# Patient Record
Sex: FEMALE | Race: OTHER | NOT HISPANIC OR LATINO
[De-identification: names, ages, dates, MRNs, and addresses within clinical notes are randomized per-mention and may not be internally consistent; named-entity substitution may affect disease eponyms.]

---

## 2020-01-17 ENCOUNTER — APPOINTMENT (OUTPATIENT)
Dept: ORTHOPEDIC SURGERY | Facility: CLINIC | Age: 55
End: 2020-01-17
Payer: COMMERCIAL

## 2020-01-17 VITALS — SYSTOLIC BLOOD PRESSURE: 123 MMHG | HEART RATE: 75 BPM | DIASTOLIC BLOOD PRESSURE: 82 MMHG | WEIGHT: 168 LBS

## 2020-01-17 DIAGNOSIS — M17.10 UNILATERAL PRIMARY OSTEOARTHRITIS, UNSPECIFIED KNEE: ICD-10-CM

## 2020-01-17 DIAGNOSIS — Z78.9 OTHER SPECIFIED HEALTH STATUS: ICD-10-CM

## 2020-01-17 DIAGNOSIS — Z86.79 PERSONAL HISTORY OF OTHER DISEASES OF THE CIRCULATORY SYSTEM: ICD-10-CM

## 2020-01-17 PROCEDURE — 99204 OFFICE O/P NEW MOD 45 MIN: CPT

## 2020-01-17 PROCEDURE — 73562 X-RAY EXAM OF KNEE 3: CPT | Mod: LT

## 2020-01-17 PROCEDURE — 72170 X-RAY EXAM OF PELVIS: CPT

## 2020-01-30 ENCOUNTER — FORM ENCOUNTER (OUTPATIENT)
Age: 55
End: 2020-01-30

## 2020-01-31 ENCOUNTER — OUTPATIENT (OUTPATIENT)
Dept: OUTPATIENT SERVICES | Facility: HOSPITAL | Age: 55
LOS: 1 days | End: 2020-01-31
Payer: COMMERCIAL

## 2020-01-31 ENCOUNTER — APPOINTMENT (OUTPATIENT)
Dept: MRI IMAGING | Facility: CLINIC | Age: 55
End: 2020-01-31
Payer: COMMERCIAL

## 2020-01-31 DIAGNOSIS — Z00.8 ENCOUNTER FOR OTHER GENERAL EXAMINATION: ICD-10-CM

## 2020-01-31 DIAGNOSIS — Z98.89 OTHER SPECIFIED POSTPROCEDURAL STATES: Chronic | ICD-10-CM

## 2020-01-31 PROCEDURE — 73721 MRI JNT OF LWR EXTRE W/O DYE: CPT

## 2020-01-31 PROCEDURE — 73721 MRI JNT OF LWR EXTRE W/O DYE: CPT | Mod: 26,LT

## 2020-02-12 PROBLEM — Z78.9 NO PERTINENT FAMILY HISTORY: Status: ACTIVE | Noted: 2020-02-12

## 2020-02-12 PROBLEM — M17.10 ARTHRITIS OF KNEE: Status: RESOLVED | Noted: 2020-02-12 | Resolved: 2020-02-12

## 2020-02-12 PROBLEM — Z86.79 HISTORY OF AORTIC VALVE STENOSIS: Status: RESOLVED | Noted: 2020-02-12 | Resolved: 2020-02-12

## 2020-02-12 RX ORDER — ATORVASTATIN CALCIUM 80 MG/1
TABLET, FILM COATED ORAL
Refills: 0 | Status: ACTIVE | COMMUNITY

## 2020-02-12 NOTE — CONSULT LETTER
[Consult Letter:] : I had the pleasure of evaluating your patient, [unfilled]. [Dear  ___] : Dear  [unfilled], [Please see my note below.] : Please see my note below. [Consult Closing:] : Thank you very much for allowing me to participate in the care of this patient.  If you have any questions, please do not hesitate to contact me. [Sincerely,] : Sincerely, [FreeTextEntry3] : Jarad Dominguez MD\par \par ______________________________________________\par [facility]: Hip/Knee Arthroplasty\par [address], [address], [place] [address]\par (t) [phone]\par (f) [phone]\par  [FreeTextEntry2] : RAZIA CONTRERAS\par

## 2020-02-12 NOTE — DISCUSSION/SUMMARY
[de-identified] : bilateral knee early DJD, left knee likely meniscus tear due to pain and mechanical symptoms. \par The natural history and treatment of degenerative arthritis was discussed with the patient at length today. The spectrum of treatment including nonoperative modalities to surgical intervention was elucidated. Noninvasive and nonoperative treatment modalities include weight reduction, activity modification with low impact exercise,  as needed use of acetaminophen or anti-inflammatory medications if tolerated, glucosamine/chondroitin supplements, and physical therapy. Further treatments can include corticosteroid injection and the use of viscosupplementation with hyaluronic acid injections. Definitive surgical treatment can certainly include total joint arthroplasty also. The risks and benefits of each treatment options was discussed and all questions were answered.\par The patient was informed of the findings and recommended conservative management in the form of a home exercise program, activity modifications, stationary bicycling, swimming and weight loss program. A trial of Glucosamine and Chondroiten Sulphate was recommended.\par I have recommended an MRI of the left knee to evaluate for medial meniscus tear. \par A prescription for a course of physical therapy was provided for bilateral knees\par A prescription for non-steroidal anti-inflammatory medication - diclofenac was provided and the risks, benefits and side effects were discussed.\par Concerning the bilateral LE edema with varicose veins, compression has been recommended, as well as elevation of the lower extremities. She  has been recommended for exercise, including foot pumps and walking. \par Follow up has been recommended following the MRI to review.\par \par \par \par

## 2020-02-12 NOTE — HISTORY OF PRESENT ILLNESS
[de-identified] : Ms. CISCO BARBER is a 55 year old female presents with bilateral knee pain, left more than right, beginning on 11/27/2019, now worsening. She notes the pain is sharp, localized to the medial aspect of the knee, but can be present generalized anterior and posterior knee as well. She notes the pain is intermittent, and present when walking, and with extension of the knee. She notes she feels that something inside her left knee has flipped, causing the knee to lock up with extension. She denies this feeling in the right knee. She admits to an aspiration of the left knee one month ago, with a steroid injection, with only mild relief for a short period. She admits to swelling of the knee. \par She denies any recent physical therapy. She notes she has relief with heat and rest. \par She presents for evaluation. \par She admits to a prior history of arthritis of the knees. \par She denies family history of any musculoskeletal conditions. \par She denies use of tobacco, alcohol and recreational drugs. She denies exercise. She lives with her family in a house.  [Worsening] : worsening [Intermit.] : ~He/She~ states the symptoms seem to be intermittent

## 2020-02-12 NOTE — PHYSICAL EXAM
[UE] : 5/5 motor strength in bilateral upper extremities [LE] : Sensory: Intact in bilateral lower extremities [Knee] : patellar 2+ and symmetric bilaterally [Ankle] : ankle 2+ and symmetric bilaterally [DP] : dorsalis pedis 2+ and symmetric bilaterally [PT] : posterior tibial 2+ and symmetric bilaterally [de-identified] : On general examination the patient is adequately groomed and nourished. The vital parameters are as recorded. \par There is no lymphedema, generalized swelling bilateral LE with varicose veins, no skin warmth/erythema/scars/swelling, no ulcers and no palpable lymph nodes or masses in both lower extremities. Bilateral pedal pulses are well palpable.\par Upper Extremity:\par Both right and left upper extremities are unremarkable in terms of skin rash, lesions, pigmentation, redness, tenderness, swelling, joint instability, abnormal deformity or crepitus. The overall range of motion, sensation, motor tone and strength testing are normal.\par \par Bilateral Knee Exam: \par The gait is bilateral stiff knee antalgic.\par Knee alignment:      slight valgus\par Both knees demonstrate no scars and the skin has no warmth, erythema, swelling or tenderness. \par Both knees have a range of motion of\par Extension:                    Right 0 degrees     Left 0 degrees\par Flexion:                                   Right 110 degrees      Left 100 degrees\par There is more pain with range of motion of the left knee than the right. \par There is right knee medial joint line tenderness, left knee medial joint line tenderness. There is left knee mild effusion. \par Fahad's test is positive and Chin test is positive on the left knee. Both are negative on the right. \par Lachman's test, Anterior/Posterior Drawer test and Pivot Shift Tests are negative. \par There is no mediolateral laxity and no anteroposterior instability. \par Patella compression test is positive and patellofemoral tracking is normal with no lateral subluxation, apprehension or instability. \par Right knee quadriceps and hamstrings power is 4+.\par Left knee quadriceps and hamstrings power is 4+.\par \par Hip Exam:\par The gait and station is normal\par The patient has equal leg lengths and no pelvic tilt. Hany/Adrienne test is 7 inches on the right and 7 inches on the left. Active SLR is 60 degrees on the right and 60 degrees on the left. Both hips demonstrate no scars and the skin has no signs of inflammation or tenderness. \par Both Hips have a normal range of motion of flexion to 100 degrees, abduction 40 degrees, adduction 20 degrees, external rotation 40 degrees, internal rotation 20 degrees with symmetrical motion in flexion and extension. There is no flexion contracture, deformity or instability. Labral impingement tests are negative.\par Both hips flexor, abductor and extensor power is normal.\par  [de-identified] : The following radiographs were ordered and read by me during this patients visit. I reviewed each radiograph in detail with the patient and discussed the findings as highlighted below. \par AP, lateral and skyline views of the bilateral knees confirm right knee mild to moderate DJD, with lateral joint line narrowing and osteophyte formation, left knee mild arthritis, with bone spurring lateral joint line. There is bilateral knee patellofemoral arthritis.  There is no sign of fracture bilateral knees. \par AP view of the pelvis are within normal limits\par

## 2020-02-14 ENCOUNTER — TRANSCRIPTION ENCOUNTER (OUTPATIENT)
Age: 55
End: 2020-02-14

## 2020-02-14 ENCOUNTER — INPATIENT (INPATIENT)
Facility: HOSPITAL | Age: 55
LOS: 1 days | Discharge: HOME | End: 2020-02-16
Attending: INTERNAL MEDICINE | Admitting: INTERNAL MEDICINE
Payer: COMMERCIAL

## 2020-02-14 VITALS
OXYGEN SATURATION: 97 % | WEIGHT: 160.06 LBS | HEIGHT: 62 IN | TEMPERATURE: 100 F | SYSTOLIC BLOOD PRESSURE: 139 MMHG | HEART RATE: 91 BPM | DIASTOLIC BLOOD PRESSURE: 79 MMHG | RESPIRATION RATE: 20 BRPM

## 2020-02-14 DIAGNOSIS — Z98.891 HISTORY OF UTERINE SCAR FROM PREVIOUS SURGERY: Chronic | ICD-10-CM

## 2020-02-14 DIAGNOSIS — Z98.89 OTHER SPECIFIED POSTPROCEDURAL STATES: Chronic | ICD-10-CM

## 2020-02-14 LAB
ALBUMIN SERPL ELPH-MCNC: 3.6 G/DL — SIGNIFICANT CHANGE UP (ref 3.3–5)
ALP SERPL-CCNC: 108 U/L — SIGNIFICANT CHANGE UP (ref 40–120)
ALT FLD-CCNC: 19 U/L — SIGNIFICANT CHANGE UP (ref 12–78)
AMYLASE P1 CFR SERPL: 48 U/L — SIGNIFICANT CHANGE UP (ref 25–115)
ANION GAP SERPL CALC-SCNC: 5 MMOL/L — SIGNIFICANT CHANGE UP (ref 5–17)
ANION GAP SERPL CALC-SCNC: 7 MMOL/L — SIGNIFICANT CHANGE UP (ref 5–17)
APPEARANCE UR: CLEAR — SIGNIFICANT CHANGE UP
AST SERPL-CCNC: 27 U/L — SIGNIFICANT CHANGE UP (ref 15–37)
BASOPHILS # BLD AUTO: 0.05 K/UL — SIGNIFICANT CHANGE UP (ref 0–0.2)
BASOPHILS NFR BLD AUTO: 0.3 % — SIGNIFICANT CHANGE UP (ref 0–2)
BILIRUB SERPL-MCNC: 0.8 MG/DL — SIGNIFICANT CHANGE UP (ref 0.2–1.2)
BILIRUB UR-MCNC: NEGATIVE — SIGNIFICANT CHANGE UP
BUN SERPL-MCNC: 10 MG/DL — SIGNIFICANT CHANGE UP (ref 7–23)
BUN SERPL-MCNC: 12 MG/DL — SIGNIFICANT CHANGE UP (ref 7–23)
CALCIUM SERPL-MCNC: 8.2 MG/DL — LOW (ref 8.5–10.1)
CALCIUM SERPL-MCNC: 9 MG/DL — SIGNIFICANT CHANGE UP (ref 8.5–10.1)
CHLORIDE SERPL-SCNC: 108 MMOL/L — SIGNIFICANT CHANGE UP (ref 96–108)
CHLORIDE SERPL-SCNC: 109 MMOL/L — HIGH (ref 96–108)
CO2 SERPL-SCNC: 26 MMOL/L — SIGNIFICANT CHANGE UP (ref 22–31)
CO2 SERPL-SCNC: 27 MMOL/L — SIGNIFICANT CHANGE UP (ref 22–31)
COLOR SPEC: YELLOW — SIGNIFICANT CHANGE UP
CREAT SERPL-MCNC: 0.72 MG/DL — SIGNIFICANT CHANGE UP (ref 0.5–1.3)
CREAT SERPL-MCNC: 0.76 MG/DL — SIGNIFICANT CHANGE UP (ref 0.5–1.3)
DIFF PNL FLD: ABNORMAL
EOSINOPHIL # BLD AUTO: 0.16 K/UL — SIGNIFICANT CHANGE UP (ref 0–0.5)
EOSINOPHIL NFR BLD AUTO: 1 % — SIGNIFICANT CHANGE UP (ref 0–6)
EPI CELLS # UR: SIGNIFICANT CHANGE UP
GLUCOSE SERPL-MCNC: 135 MG/DL — HIGH (ref 70–99)
GLUCOSE SERPL-MCNC: 167 MG/DL — HIGH (ref 70–99)
GLUCOSE UR QL: NEGATIVE MG/DL — SIGNIFICANT CHANGE UP
HCT VFR BLD CALC: 34 % — LOW (ref 34.5–45)
HCT VFR BLD CALC: 40.8 % — SIGNIFICANT CHANGE UP (ref 34.5–45)
HGB BLD-MCNC: 11.3 G/DL — LOW (ref 11.5–15.5)
HGB BLD-MCNC: 13.5 G/DL — SIGNIFICANT CHANGE UP (ref 11.5–15.5)
IMM GRANULOCYTES NFR BLD AUTO: 0.4 % — SIGNIFICANT CHANGE UP (ref 0–1.5)
KETONES UR-MCNC: ABNORMAL
LACTATE SERPL-SCNC: 1.7 MMOL/L — SIGNIFICANT CHANGE UP (ref 0.7–2)
LEUKOCYTE ESTERASE UR-ACNC: NEGATIVE — SIGNIFICANT CHANGE UP
LIDOCAIN IGE QN: 100 U/L — SIGNIFICANT CHANGE UP (ref 73–393)
LYMPHOCYTES # BLD AUTO: 1.26 K/UL — SIGNIFICANT CHANGE UP (ref 1–3.3)
LYMPHOCYTES # BLD AUTO: 7.7 % — LOW (ref 13–44)
MCHC RBC-ENTMCNC: 27.7 PG — SIGNIFICANT CHANGE UP (ref 27–34)
MCHC RBC-ENTMCNC: 27.9 PG — SIGNIFICANT CHANGE UP (ref 27–34)
MCHC RBC-ENTMCNC: 33.1 GM/DL — SIGNIFICANT CHANGE UP (ref 32–36)
MCHC RBC-ENTMCNC: 33.2 GM/DL — SIGNIFICANT CHANGE UP (ref 32–36)
MCV RBC AUTO: 83.8 FL — SIGNIFICANT CHANGE UP (ref 80–100)
MCV RBC AUTO: 84 FL — SIGNIFICANT CHANGE UP (ref 80–100)
MONOCYTES # BLD AUTO: 0.86 K/UL — SIGNIFICANT CHANGE UP (ref 0–0.9)
MONOCYTES NFR BLD AUTO: 5.3 % — SIGNIFICANT CHANGE UP (ref 2–14)
NEUTROPHILS # BLD AUTO: 13.93 K/UL — HIGH (ref 1.8–7.4)
NEUTROPHILS NFR BLD AUTO: 85.3 % — HIGH (ref 43–77)
NITRITE UR-MCNC: NEGATIVE — SIGNIFICANT CHANGE UP
NRBC # BLD: 0 /100 WBCS — SIGNIFICANT CHANGE UP (ref 0–0)
NRBC # BLD: 0 /100 WBCS — SIGNIFICANT CHANGE UP (ref 0–0)
PH UR: 8 — SIGNIFICANT CHANGE UP (ref 5–8)
PLATELET # BLD AUTO: 220 K/UL — SIGNIFICANT CHANGE UP (ref 150–400)
PLATELET # BLD AUTO: 247 K/UL — SIGNIFICANT CHANGE UP (ref 150–400)
POTASSIUM SERPL-MCNC: 3.4 MMOL/L — LOW (ref 3.5–5.3)
POTASSIUM SERPL-MCNC: 4.9 MMOL/L — SIGNIFICANT CHANGE UP (ref 3.5–5.3)
POTASSIUM SERPL-SCNC: 3.4 MMOL/L — LOW (ref 3.5–5.3)
POTASSIUM SERPL-SCNC: 4.9 MMOL/L — SIGNIFICANT CHANGE UP (ref 3.5–5.3)
PROT SERPL-MCNC: 7.7 GM/DL — SIGNIFICANT CHANGE UP (ref 6–8.3)
PROT UR-MCNC: NEGATIVE MG/DL — SIGNIFICANT CHANGE UP
RBC # BLD: 4.05 M/UL — SIGNIFICANT CHANGE UP (ref 3.8–5.2)
RBC # BLD: 4.87 M/UL — SIGNIFICANT CHANGE UP (ref 3.8–5.2)
RBC # FLD: 12.9 % — SIGNIFICANT CHANGE UP (ref 10.3–14.5)
RBC # FLD: 13 % — SIGNIFICANT CHANGE UP (ref 10.3–14.5)
RBC CASTS # UR COMP ASSIST: ABNORMAL /HPF (ref 0–4)
SODIUM SERPL-SCNC: 141 MMOL/L — SIGNIFICANT CHANGE UP (ref 135–145)
SODIUM SERPL-SCNC: 141 MMOL/L — SIGNIFICANT CHANGE UP (ref 135–145)
SP GR SPEC: 1.01 — SIGNIFICANT CHANGE UP (ref 1.01–1.02)
TROPONIN I SERPL-MCNC: <.015 NG/ML — SIGNIFICANT CHANGE UP (ref 0.01–0.04)
UROBILINOGEN FLD QL: NEGATIVE MG/DL — SIGNIFICANT CHANGE UP
WBC # BLD: 10.81 K/UL — HIGH (ref 3.8–10.5)
WBC # BLD: 16.33 K/UL — HIGH (ref 3.8–10.5)
WBC # FLD AUTO: 10.81 K/UL — HIGH (ref 3.8–10.5)
WBC # FLD AUTO: 16.33 K/UL — HIGH (ref 3.8–10.5)

## 2020-02-14 PROCEDURE — 99221 1ST HOSP IP/OBS SF/LOW 40: CPT | Mod: AI

## 2020-02-14 PROCEDURE — 74019 RADEX ABDOMEN 2 VIEWS: CPT | Mod: 26

## 2020-02-14 PROCEDURE — 99253 IP/OBS CNSLTJ NEW/EST LOW 45: CPT

## 2020-02-14 PROCEDURE — 99232 SBSQ HOSP IP/OBS MODERATE 35: CPT

## 2020-02-14 PROCEDURE — 93010 ELECTROCARDIOGRAM REPORT: CPT

## 2020-02-14 PROCEDURE — 76700 US EXAM ABDOM COMPLETE: CPT | Mod: 26

## 2020-02-14 PROCEDURE — 74177 CT ABD & PELVIS W/CONTRAST: CPT | Mod: 26

## 2020-02-14 PROCEDURE — 71045 X-RAY EXAM CHEST 1 VIEW: CPT | Mod: 26

## 2020-02-14 PROCEDURE — 99285 EMERGENCY DEPT VISIT HI MDM: CPT

## 2020-02-14 RX ORDER — MORPHINE SULFATE 50 MG/1
4 CAPSULE, EXTENDED RELEASE ORAL ONCE
Refills: 0 | Status: DISCONTINUED | OUTPATIENT
Start: 2020-02-14 | End: 2020-02-14

## 2020-02-14 RX ORDER — SODIUM CHLORIDE 9 MG/ML
1000 INJECTION, SOLUTION INTRAVENOUS
Refills: 0 | Status: DISCONTINUED | OUTPATIENT
Start: 2020-02-14 | End: 2020-02-14

## 2020-02-14 RX ORDER — IOHEXOL 300 MG/ML
60 INJECTION, SOLUTION INTRAVENOUS ONCE
Refills: 0 | Status: COMPLETED | OUTPATIENT
Start: 2020-02-14 | End: 2020-02-14

## 2020-02-14 RX ORDER — SODIUM CHLORIDE 9 MG/ML
1000 INJECTION INTRAMUSCULAR; INTRAVENOUS; SUBCUTANEOUS ONCE
Refills: 0 | Status: DISCONTINUED | OUTPATIENT
Start: 2020-02-14 | End: 2020-02-14

## 2020-02-14 RX ORDER — HEPARIN SODIUM 5000 [USP'U]/ML
5000 INJECTION INTRAVENOUS; SUBCUTANEOUS EVERY 12 HOURS
Refills: 0 | Status: DISCONTINUED | OUTPATIENT
Start: 2020-02-14 | End: 2020-02-16

## 2020-02-14 RX ORDER — ONDANSETRON 8 MG/1
4 TABLET, FILM COATED ORAL ONCE
Refills: 0 | Status: COMPLETED | OUTPATIENT
Start: 2020-02-14 | End: 2020-02-14

## 2020-02-14 RX ORDER — METOCLOPRAMIDE HCL 10 MG
10 TABLET ORAL ONCE
Refills: 0 | Status: COMPLETED | OUTPATIENT
Start: 2020-02-14 | End: 2020-02-14

## 2020-02-14 RX ORDER — PANTOPRAZOLE SODIUM 20 MG/1
40 TABLET, DELAYED RELEASE ORAL DAILY
Refills: 0 | Status: DISCONTINUED | OUTPATIENT
Start: 2020-02-14 | End: 2020-02-16

## 2020-02-14 RX ORDER — SODIUM CHLORIDE 9 MG/ML
1000 INJECTION INTRAMUSCULAR; INTRAVENOUS; SUBCUTANEOUS ONCE
Refills: 0 | Status: COMPLETED | OUTPATIENT
Start: 2020-02-14 | End: 2020-02-14

## 2020-02-14 RX ORDER — ONDANSETRON 8 MG/1
4 TABLET, FILM COATED ORAL EVERY 6 HOURS
Refills: 0 | Status: DISCONTINUED | OUTPATIENT
Start: 2020-02-14 | End: 2020-02-16

## 2020-02-14 RX ORDER — PANTOPRAZOLE SODIUM 20 MG/1
40 TABLET, DELAYED RELEASE ORAL ONCE
Refills: 0 | Status: COMPLETED | OUTPATIENT
Start: 2020-02-14 | End: 2020-02-14

## 2020-02-14 RX ORDER — ACETAMINOPHEN 500 MG
1000 TABLET ORAL ONCE
Refills: 0 | Status: COMPLETED | OUTPATIENT
Start: 2020-02-14 | End: 2020-02-14

## 2020-02-14 RX ORDER — PIPERACILLIN AND TAZOBACTAM 4; .5 G/20ML; G/20ML
3.38 INJECTION, POWDER, LYOPHILIZED, FOR SOLUTION INTRAVENOUS EVERY 8 HOURS
Refills: 0 | Status: DISCONTINUED | OUTPATIENT
Start: 2020-02-14 | End: 2020-02-14

## 2020-02-14 RX ORDER — SODIUM CHLORIDE 9 MG/ML
1000 INJECTION, SOLUTION INTRAVENOUS
Refills: 0 | Status: DISCONTINUED | OUTPATIENT
Start: 2020-02-14 | End: 2020-02-16

## 2020-02-14 RX ORDER — POTASSIUM CHLORIDE 20 MEQ
10 PACKET (EA) ORAL
Refills: 0 | Status: COMPLETED | OUTPATIENT
Start: 2020-02-14 | End: 2020-02-14

## 2020-02-14 RX ORDER — DEXTROSE MONOHYDRATE, SODIUM CHLORIDE, AND POTASSIUM CHLORIDE 50; .745; 4.5 G/1000ML; G/1000ML; G/1000ML
1000 INJECTION, SOLUTION INTRAVENOUS
Refills: 0 | Status: DISCONTINUED | OUTPATIENT
Start: 2020-02-14 | End: 2020-02-14

## 2020-02-14 RX ADMIN — IOHEXOL 30 MILLILITER(S): 300 INJECTION, SOLUTION INTRAVENOUS at 11:57

## 2020-02-14 RX ADMIN — Medication 1000 MILLIGRAM(S): at 15:00

## 2020-02-14 RX ADMIN — SODIUM CHLORIDE 1000 MILLILITER(S): 9 INJECTION INTRAMUSCULAR; INTRAVENOUS; SUBCUTANEOUS at 03:55

## 2020-02-14 RX ADMIN — HEPARIN SODIUM 5000 UNIT(S): 5000 INJECTION INTRAVENOUS; SUBCUTANEOUS at 17:56

## 2020-02-14 RX ADMIN — PIPERACILLIN AND TAZOBACTAM 25 GRAM(S): 4; .5 INJECTION, POWDER, LYOPHILIZED, FOR SOLUTION INTRAVENOUS at 05:47

## 2020-02-14 RX ADMIN — PANTOPRAZOLE SODIUM 40 MILLIGRAM(S): 20 TABLET, DELAYED RELEASE ORAL at 12:57

## 2020-02-14 RX ADMIN — ONDANSETRON 4 MILLIGRAM(S): 8 TABLET, FILM COATED ORAL at 02:01

## 2020-02-14 RX ADMIN — Medication 400 MILLIGRAM(S): at 13:16

## 2020-02-14 RX ADMIN — SODIUM CHLORIDE 110 MILLILITER(S): 9 INJECTION, SOLUTION INTRAVENOUS at 11:55

## 2020-02-14 RX ADMIN — MORPHINE SULFATE 4 MILLIGRAM(S): 50 CAPSULE, EXTENDED RELEASE ORAL at 02:01

## 2020-02-14 RX ADMIN — Medication 30 MILLILITER(S): at 03:54

## 2020-02-14 RX ADMIN — HEPARIN SODIUM 5000 UNIT(S): 5000 INJECTION INTRAVENOUS; SUBCUTANEOUS at 05:47

## 2020-02-14 RX ADMIN — MORPHINE SULFATE 4 MILLIGRAM(S): 50 CAPSULE, EXTENDED RELEASE ORAL at 02:31

## 2020-02-14 RX ADMIN — ONDANSETRON 4 MILLIGRAM(S): 8 TABLET, FILM COATED ORAL at 04:11

## 2020-02-14 RX ADMIN — SODIUM CHLORIDE 100 MILLILITER(S): 9 INJECTION, SOLUTION INTRAVENOUS at 07:47

## 2020-02-14 RX ADMIN — Medication 100 MILLIEQUIVALENT(S): at 14:59

## 2020-02-14 RX ADMIN — Medication 100 MILLIEQUIVALENT(S): at 17:56

## 2020-02-14 RX ADMIN — Medication 10 MILLIGRAM(S): at 12:57

## 2020-02-14 RX ADMIN — PANTOPRAZOLE SODIUM 40 MILLIGRAM(S): 20 TABLET, DELAYED RELEASE ORAL at 03:55

## 2020-02-14 RX ADMIN — Medication 100 MILLIEQUIVALENT(S): at 12:56

## 2020-02-14 RX ADMIN — Medication 10 MILLIGRAM(S): at 05:32

## 2020-02-14 NOTE — H&P ADULT - ASSESSMENT
56y/o female with High grade SBO  - as discussed with Dr. Lyons  - npo/ngt/ivf  - pain meds  - dvt/gi ppx  - medical/cardio consult  - repeat AXR  - f/u UA/urine culture

## 2020-02-14 NOTE — H&P ADULT - HISTORY OF PRESENT ILLNESS
56y/o with H/O Aortic Stenosis, HLD, GERD, hiatal hernia, PSH of c-sections presents to ER with c/o epigastric pain x3days. Pt states she first had nausea then abdominal pain that has gotten worse. Pain is now 6/10, nonradiating had one episode of nonbloody emesis in ER.  Denies diarrhea, dyspnea, chest pain.  Patient has been taking naproxen for arthritis, took omeprazole at home without relief. Pt is followed by GI (Dr. Bledsoe), last colonoscopy and EGD within the last yr. Pt seen by Dr. Almonte (cardiology). Last BM and flatus was last night, also last meal. Pt recently treated for UTI with 5days of cipro.

## 2020-02-14 NOTE — ED ADULT TRIAGE NOTE - CHIEF COMPLAINT QUOTE
Patient reports through  : epigastric pain, nausea , vomiting diarrhea. Pain started this morning, progressively worsening.

## 2020-02-14 NOTE — CHART NOTE - NSCHARTNOTEFT_GEN_A_CORE
Pt discussed with Dr Lyons, labs/imaging reviewed.  Pt with +flatus and BM.               11.3   10.81 )-----------( 220      ( 14 Feb 2020 10:53 )             34.0   02-14    141  |  109<H>  |  10  ----------------------------<  167<H>  3.4<L>   |  27  |  0.72    Ca    8.2<L>      14 Feb 2020 10:53    TPro  7.7  /  Alb  3.6  /  TBili  0.8  /  DBili  x   /  AST  27  /  ALT  19  /  AlkPhos  108  02-14    *Hypokalemia was repleted IV earlier today.  *XR reviewed. Contrast in colon  *Per Dr Lyons, diet advanced to full liquids.  *Continue to monitor.

## 2020-02-14 NOTE — H&P ADULT - NSHPPHYSICALEXAM_GEN_ALL_CORE
Gen: Alert, distressed  Head: NC, AT, normal lids/conjunctiva  ENT: normal hearing, patent oropharynx without erythema/exudate, uvula midline  Neck: +supple, no JVD, +Trachea midline  Pulm: Bilateral BS, normal resp effort, no wheeze/stridor/retractions  CV: RRR, no M/R/G, +dist pulses	  Abd: obese, soft, csection scar healed with huge scar,  ND, +epigastric pain,+BS, umbilical hernia reduced,  Mskel: no edema/erythema/cyanosis  Skin: no rash, warm/dry  Neuro: AAOx3, no apparent sensory/motor deficits, coordination intact

## 2020-02-14 NOTE — ED ADULT NURSE NOTE - NSIMPLEMENTINTERV_GEN_ALL_ED
Implemented All Universal Safety Interventions:  [place] to call system. Call bell, personal items and telephone within reach. Instruct patient to call for assistance. Room bathroom lighting operational. Non-slip footwear when patient is off stretcher. Physically safe environment: no spills, clutter or unnecessary equipment. Stretcher in lowest position, wheels locked, appropriate side rails in place.

## 2020-02-14 NOTE — PROGRESS NOTE ADULT - SUBJECTIVE AND OBJECTIVE BOX
Surgery NP note  Patient seen and examined with Dr. Lyons with daughter at bedside resting comfortably.  Patient states she is doing better, pain has improved from last night.   No complaints offered. Abdominal pain is well controlled.  Denies nausea and vomiting. NPO  No flatus/BM reported.     T(F): 97.6 (02-14-20 @ 05:10), Max: 100 (02-14-20 @ 00:31)  HR: 74 (02-14-20 @ 07:43) (74 - 91)  BP: 125/89 (02-14-20 @ 07:43) (125/89 - 141/81)  RR: 19 (02-14-20 @ 07:43) (14 - 20)  SpO2: 96% (02-14-20 @ 07:43) (96% - 97%)  Wt(kg): --  CAPILLARY BLOOD GLUCOSE          PHYSICAL EXAM:  General: NAD, WDWN.   Neuro:  Alert & responsive  HEENT: NCAT, EOMI, conjunctiva clear  CV: +S1+S2 regular rate and rhythm  Lung: clear to ausculation bilaterally, respirations nonlabored, good inspiratory effort  Abdomen: soft, Non tender, No Distension. Lower abdominal Midline scar present. Normal active BS. Small reducible umbilical hernia present.   Extremities: no pedal edema or calf tenderness noted     LABS:                        13.5   16.33 )-----------( 247      ( 14 Feb 2020 02:06 )             40.8     02-14    141  |  108  |  12  ----------------------------<  135<H>  4.9   |  26  |  0.76    Ca    9.0      14 Feb 2020 02:06    TPro  7.7  /  Alb  3.6  /  TBili  0.8  /  DBili  x   /  AST  27  /  ALT  19  /  AlkPhos  108  02-14    LIVER FUNCTIONS - ( 14 Feb 2020 02:06 )  Alb: 3.6 g/dL / Pro: 7.7 gm/dL / ALK PHOS: 108 U/L / ALT: 19 U/L / AST: 27 U/L / GGT: x             I&O's Detail        Impression: 55y Female admitted with high grade SBO and umbilical hernia with interval reduction in ER by surgical team. Clinically improving, leukocytosis.   PMH Hiatal hernia with GERD  High cholesterol  History of aortic stenosis  Acid reflux      Plan:  -Dulcolax suppository x1, Gastrograffin challenge today. Follow up with X-ray   - Follow up urine culture  - Continue NPO with IV hydration.   - Serial abdominal exams.   -continue VTE prophylaxis with SQ heparin and SCDs  -Increase activity with PT, OOB, Ambulate  -Patient instructed on and encouraged incentive spirometry use  -f/u AM labs  -will discuss with surgical attending for Recommendations Surgery NP note  Patient seen and examined with Dr. Lyons with daughter at bedside resting comfortably.  Patient states she is doing better, pain has improved from last night.   No complaints offered. Abdominal pain is well controlled.  Denies nausea and vomiting. NPO  No flatus/BM reported.     T(F): 97.6 (02-14-20 @ 05:10), Max: 100 (02-14-20 @ 00:31)  HR: 74 (02-14-20 @ 07:43) (74 - 91)  BP: 125/89 (02-14-20 @ 07:43) (125/89 - 141/81)  RR: 19 (02-14-20 @ 07:43) (14 - 20)  SpO2: 96% (02-14-20 @ 07:43) (96% - 97%)  Wt(kg): --  CAPILLARY BLOOD GLUCOSE          PHYSICAL EXAM:  General: NAD, WDWN.   Neuro:  Alert & responsive  HEENT: NCAT, EOMI, conjunctiva clear  CV: +S1+S2 regular rate and rhythm  Lung: clear to ausculation bilaterally, respirations nonlabored, good inspiratory effort  Abdomen: soft, Non tender, No Distension. Lower abdominal Midline scar present. Normal active BS. Small reducible umbilical hernia present.   Extremities: no pedal edema or calf tenderness noted     LABS:                        13.5   16.33 )-----------( 247      ( 14 Feb 2020 02:06 )             40.8     02-14 02-14    141  |  109<H>  |  10  ----------------------------<  167<H>  3.4<L>   |  27  |  0.72    Ca    8.2<L>      14 Feb 2020 10:53    TPro  7.7  /  Alb  3.6  /  TBili  0.8  /  DBili  x   /  AST  27  /  ALT  19  /  AlkPhos  108  02-14      Ca    9.0      14 Feb 2020 02:06    TPro  7.7  /  Alb  3.6  /  TBili  0.8  /  DBili  x   /  AST  27  /  ALT  19  /  AlkPhos  108  02-14    LIVER FUNCTIONS - ( 14 Feb 2020 02:06 )  Alb: 3.6 g/dL / Pro: 7.7 gm/dL / ALK PHOS: 108 U/L / ALT: 19 U/L / AST: 27 U/L / GGT: x             I&O's Detail        Impression: 55y Female admitted with high grade SBO and umbilical hernia with interval reduction in ER by surgical team. Clinically improving, leukocytosis, Hypokalemia    PMH Hiatal hernia with GERD  High cholesterol  History of aortic stenosis  Acid reflux      Plan:  -Dulcolax suppository x1, Gastrograffin challenge today. Follow up with X-ray   - Follow up urine culture  - Continue NPO with IV hydration. Hypokalemia repleted  - Serial abdominal exams.   -continue VTE prophylaxis with SQ heparin and SCDs  -Increase activity with PT, OOB, Ambulate  -Patient instructed on and encouraged incentive spirometry use  -f/u AM labs  -will discuss with surgical attending for Recommendations

## 2020-02-14 NOTE — H&P ADULT - NSICDXPASTMEDICALHX_GEN_ALL_CORE_FT
PAST MEDICAL HISTORY:  Acid reflux     Hiatal hernia with GERD     High cholesterol     History of aortic stenosis

## 2020-02-14 NOTE — H&P ADULT - NSHPREVIEWOFSYSTEMS_GEN_ALL_CORE
ROS: No fever/chills, No headache/photophobia/eye pain/changes in vision, No ear pain/sore throat/dysphagia, No chest pain/palpitations, no SOB/cough/wheeze/stridor, No D/melena, no dysuria/frequency/discharge, No neck/back pain, no rash, no changes in neurological

## 2020-02-14 NOTE — H&P ADULT - NSHPLABSRESULTS_GEN_ALL_CORE
FINDINGS:    LOWER CHEST: Lingular linear atelectasis. Aortic valve annulus calcifications.    LIVER: Subcentimeter hypodense lesion which is too small for accurate characterization.  BILE DUCTS: Normal caliber.  GALLBLADDER: Within normal limits.  SPLEEN: Within normal limits.  PANCREAS: Within normal limits.  ADRENALS: Within normal limits.  KIDNEYS/URETERS: Within normal limits.    BLADDER: Within normal limits.  REPRODUCTIVE ORGANS: Uterus and ovaries within normal limits.    BOWEL: Dilated loops of small bowel measuring up to 3.0 cm to a transition point at the level of the umbilicus distal to fecalized loops (series 2, images 71-80) compatible with a high-grade small bowel obstruction. Appendix within normal limits.  PERITONEUM: Small amount of abdominal and pelvic free fluid.  VESSELS: Within normal limits.  RETROPERITONEUM/LYMPH NODES: No lymphadenopathy.    ABDOMINAL WALL: Small fat-containing umbilical hernia.  BONES: Degenerative changes of the spine.    IMPRESSION:     High-grade small bowel obstruction with transition point at the level of the umbilicus.

## 2020-02-14 NOTE — PATIENT PROFILE ADULT - NSTOBACCONEVERSMOKERY/N_GEN_A
<<-----Click on this checkbox to enter Procedure Total thyroidectomy  02/05/2018    Active  [id number] No

## 2020-02-14 NOTE — CONSULT NOTE ADULT - SUBJECTIVE AND OBJECTIVE BOX
54y/o female with history Aortic Stenosis, HLD, GERD, hiatal hernia, PSH of c-sections presents to ER with c/o epigastric pain x 3 days.  Pt states she first had nausea then abdominal pain that has gotten worse. Pain is now 6/10, nonradiating had one episode of nonbloody emesis in ER.  Denies diarrhea, dyspnea, chest pain.  Patient has been taking naproxen for arthritis, took omeprazole at home without relief. Pt is followed by GI (Dr. Bledsoe), last colonoscopy and EGD within the last yr. Pt seen by Dr. Almonte (cardiology). Last BM and flatus was last night, also last meal. Pt recently treated for UTI with 5days of cipro.   She is admitted to the surgical service for high grade SBO, a medical consult was requested for management of co-morbidities while in the hospital.    PAST MEDICAL HISTORY:  Acid reflux   Hiatal hernia with GERD   High cholesterol   History of aortic stenosis.     PAST SURGICAL HISTORY:  S/P  section x 2.     FAMILY HISTORY:  FH: CVA (cerebrovascular accident).    Social History:  Social History (marital status, living situation, occupation, tobacco use, alcohol and drug use, and sexual history): Housewife.  Patient denies EtOH/tobacco/illicit substance use.    Allergies:  NKDA.   Allergic to avocado:  Angioedema      MEDICATIONS  (STANDING):  dextrose 5% + sodium chloride 0.45%. 1000 milliLiter(s) (100 mL/Hr) IV Continuous <Continuous>  heparin  Injectable 5000 Unit(s) SubCutaneous every 12 hours  pantoprazole  Injectable 40 milliGRAM(s) IV Push daily  piperacillin/tazobactam IVPB.. 3.375 Gram(s) IV Intermittent every 8 hours    MEDICATIONS  (PRN):  ondansetron Injectable 4 milliGRAM(s) IV Push every 6 hours PRN Nausea      REVIEW OF SYSTEMS:  CONSTITUTIONAL: No fever,    EYES: No eye pain, visual disturbances, or discharge  ENMT:   No sinus or throat pain  NECK: No pain or stiffness  RESPIRATORY: No cough, wheezing, chills or hemoptysis; No shortness of breath  CARDIOVASCULAR: No chest pain, palpitations, dizziness, or leg swelling  GASTROINTESTINAL:   abdominal  pain., (+) nausea, vomiting,   GENITOURINARY: No dysuria, frequency, hematuria, or incontinence  NEUROLOGICAL: No headaches, memory loss, loss of strength, numbness, or tremors  SKIN: No itching, burning, rashes, or lesions      Vital Signs Last 24 Hrs  T(C): 36.4 (2020 05:10), Max: 37.8 (2020 00:31)  T(F): 97.6 (2020 05:10), Max: 100 (2020 00:31)  HR: 74 (2020 07:43) (74 - 91)  BP: 125/89 (2020 07:43) (125/89 - 141/81)  BP(mean): --  RR: 19 (2020 07:43) (14 - 20)  SpO2: 96% (2020 07:43) (96% - 97%)    PHYSICAL EXAM:  GENERAL: NAD, well-groomed, well-developed  HEAD:  Atraumatic, Normocephalic  EYES: EOMI, PERRLA, conjunctiva and sclera clear  ENMT:  Moist mucous membranes, NGT in place.  NECK: Supple, No JVD   NERVOUS SYSTEM:  Alert & Oriented X 3, Good concentration; Motor Strength 5/5 B/L upper and lower extremities; DTRs 2+ intact and symmetric  CHEST/LUNG: Clear to auscultation  bilaterally; No rales, rhonchi, wheezing, or rubs  HEART: Regular rate and rhythm; No murmurs, rubs, or gallops  ABDOMEN: Soft, Nontender, hypoactive bowel sounds, umbilical hernia noted.  EXTREMITIES:  2+ Peripheral Pulses, No clubbing, cyanosis, or edema  LYMPH: No lymphadenopathy noted  SKIN: No rashes or lesions    LABS:                        13.5   16.33 )-----------( 247      ( 2020 02:06 )             40.8     02-14    141  |  108  |  12  ----------------------------<  135<H>  4.9   |  26  |  0.76    Ca    9.0      2020 02:06    TPro  7.7  /  Alb  3.6  /  TBili  0.8  /  DBili  x   /  AST  27  /  ALT  19  /  AlkPhos  108  02-14       cardiac markers Troponin <.015  CK --    Urinalysis Basic - ( 2020 08:50 )    Color: Yellow / Appearance: Clear / S.010 / pH: x  Gluc: x / Ketone: Small  / Bili: Negative / Urobili: Negative mg/dL   Blood: x / Protein: Negative mg/dL / Nitrite: Negative   Leuk Esterase: Negative / RBC: 3-5 /HPF / WBC x   Sq Epi: x / Non Sq Epi: Occasional / Bacteria: x      CAPILLARY BLOOD GLUCOSE        Cultures    RADIOLOGY & ADDITIONAL TESTS:    Imaging Personally Reviewed:  [ ] YES  [ ] NO    Consultant(s) Notes Reviewed:  [ ] YES  [ ] NO    Care Discussed with Consultants/Other Providers [ ] YES  [ ] NO

## 2020-02-14 NOTE — CONSULT NOTE ADULT - ASSESSMENT
54y/o female with history Aortic Stenosis, HLD, GERD, hiatal hernia, PSH of c-sections presents to ER with c/o epigastric pain x 3 days.  Pt states she first had nausea then abdominal pain that has gotten worse. Pain is now 6/10, nonradiating had one episode of nonbloody emesis in ER.   Patient has been taking naproxen for arthritis, took omeprazole at home without relief. Pt is followed by GI (Dr. Bledsoe), last colonoscopy and EGD within the last yr. Pt seen by Dr. Almonte (cardiology). Last BM and flatus was last night, also last meal. Pt recently treated for UTI with 5days of cipro.   She is admitted to the surgical service for high grade SBO, a medical consult was requested for management of co-morbidities while in the hospital.   She denies diarrhea, dyspnea, chest pain.    A/p:  High grade SBO.  - NPO.  - NGT  - IVF hydration.  - pain meds as needed  - management as per surgery.    Leukocytosis:   - likely secondary to acute illness, UA is negative.  - will monitor.    HLD  - check lipid profile in AM.  - resume anti-lipid once she is on oral diet.    H/o AS.  - ECHO  - cardiology eval.  - will review all meds.    Thank you for the medical consult.

## 2020-02-14 NOTE — CHART NOTE - NSCHARTNOTEFT_GEN_A_CORE
Notified by RN, patient had large bowel movement after xray. Xray reviewed - Contrast seen throughout Colon. NGT D/C .   Will start full liquids and advance if tolerated in am. Possible d/c home in am

## 2020-02-14 NOTE — ED PROVIDER NOTE - PHYSICAL EXAMINATION
Gen: Alert, distressed  Head: NC, AT, normal lids/conjunctiva  ENT: normal hearing, patent oropharynx without erythema/exudate, uvula midline  Neck: +supple, no JVD, +Trachea midline  Pulm: Bilateral BS, normal resp effort, no wheeze/stridor/retractions  CV: RRR, no M/R/G, +dist pulses  Abd: soft, +epigastric pain, Positive [place] signs, +BS, no palpable masses  Mskel: no edema/erythema/cyanosis  Skin: no rash, warm/dry  Neuro: AAOx3, no apparent sensory/motor deficits, coordination intact Gen: Alert, distressed  Head: NC, AT, normal lids/conjunctiva  ENT: normal hearing, patent oropharynx without erythema/exudate, uvula midline  Neck: +supple, no JVD, +Trachea midline  Pulm: Bilateral BS, normal resp effort, no wheeze/stridor/retractions  CV: RRR, systolic murmur, +dist pulses  Abd: soft, +epigastric pain, Positive [place] signs, +BS, no palpable masses  Mskel: no edema/erythema/cyanosis  Skin: no rash, warm/dry  Neuro: AAOx3, no apparent sensory/motor deficits, coordination intact

## 2020-02-14 NOTE — ED PROVIDER NOTE - OBJECTIVE STATEMENT
Pertinent PMH/PSH/FHx/SHx and Review of Systems contained within:  Patient presents to the ED for abdominal pain.  Patient p/w epigastric pain radiating to RUQ and had one episode of nonbloody emesis in ER.  Denies diarrhea, dyspnea, chest pain.  Patient has been taking naproxen for arthritis, took omeprazole at home without relief.     Relevant PMHx/SHx/SOCHx/FAMH:  GERD, C section, arthritis  Patient denies EtOH/tobacco/illicit substance use.    ROS: No fever/chills, No headache/photophobia/eye pain/changes in vision, No ear pain/sore throat/dysphagia, No chest pain/palpitations, no SOB/cough/wheeze/stridor, No D/melena, no dysuria/frequency/discharge, No neck/back pain, no rash, no changes in neurological status/function. Pertinent PMH/PSH/FHx/SHx and Review of Systems contained within:  Patient presents to the ED for abdominal pain.  Patient p/w epigastric pain radiating to RUQ and had one episode of nonbloody emesis in ER.  Denies diarrhea, dyspnea, chest pain.  Patient has been taking naproxen for arthritis, took omeprazole at home without relief.     Relevant PMHx/SHx/SOCHx/FAMH:  GERD, C section, arthritis, aortic stenosis  Patient denies EtOH/tobacco/illicit substance use.    ROS: No fever/chills, No headache/photophobia/eye pain/changes in vision, No ear pain/sore throat/dysphagia, No chest pain/palpitations, no SOB/cough/wheeze/stridor, No D/melena, no dysuria/frequency/discharge, No neck/back pain, no rash, no changes in neurological status/function.

## 2020-02-14 NOTE — ED ADULT NURSE NOTE - OBJECTIVE STATEMENT
55y female received in bed 14 c/o epigastric pain, nausea , vomiting diarrhea hx acid reflux. Pain started this morning, progressively worsening. able to complete full sentences without discomfort. no s/s of acute distress noted. will continue to monitor and provide care as needed.

## 2020-02-14 NOTE — CONSULT NOTE ADULT - SUBJECTIVE AND OBJECTIVE BOX
CHIEF COMPLAINT:  Patient is a 55y old  Female who presents with a chief complaint of SBO (2020 10:12)      HPI:  She is a pleasant 55-year-old female with known aortic stenosis seen by outpatient cardiology Dr. Almonte with last study perhaps a year and half ago. Also carries diagnoses of GERD, hiatal hernia, C-sections, hyperlipidemia and presents with epigastric pain for several days associated with nausea. Found to have a small bowel obstruction, she is currently getting NG tube gastric suction and bowel rest. Recent treatment with antibiotics for UTI also noted. No current chest complaints. Story obtained also from the patient's son at bedside. No recent chest complaints with activities of daily living however some shortness of breath noted to more moderate intensity activities.    ALLERGIES:  Allergies    avocado (Angioedema)  No Known Drug Allergies      Home Medications:  Unclear     PAST MEDICAL & SURGICAL HISTORY:  Hiatal hernia with GERD  High cholesterol  History of aortic stenosis  Acid reflux  H/O:   S/P  section: x 2        FAMILY HISTORY:  FH: CVA (cerebrovascular accident)      SOCIAL HISTORY:  no tobacco noted.    REVIEW OF SYSTEMS:  General:  No wt loss, fevers, chills, night sweats  Eyes:  Good vision, no reported pain  ENT:  No sore throat, pain, runny nose, dysphagia  CV:  No pain, palpitations, hypo/hypertension  Resp:  No dyspnea, cough, tachypnea, wheezing  GI:  No pain, nausea, vomiting, diarrhea, constipation  :  No pain, bleeding, incontinence, nocturia  Muscle:  No pain, weakness  Breast:  No pain, abscess, mass, discharge  Neuro:  No weakness, tingling, memory problems  Psych:  No fatigue, insomnia, mood problems, depression  Endocrine:  No polyuria, polydipsia, cold/heat intolerance  Heme:  No petechiae, ecchymosis, easy bruisability  Skin:  No rash, tattoos, scars, edema    PHYSICAL EXAM:  Vital Signs:  Vital Signs Last 24 Hrs  T(C): 36.5 (2020 17:23), Max: 37.8 (2020 00:31)  T(F): 97.7 (2020 17:23), Max: 100 (2020 00:31)  HR: 70 (2020 17:23) (70 - 91)  BP: 124/81 (2020 17:23) (111/72 - 141/81)  RR: 17 (2020 17:23) (14 - 20)  SpO2: 98% (2020 17:23) (96% - 98%)      Constitutional: well developed, normal appearance, well groomed, well nourished, no deformities and no acute distress.   Eyes: the conjunctiva exhibited no abnormalities and the eyelids demonstrated no xanthelasmas.   HEENT: normal oral mucosa, no oral pallor and no oral cyanosis.   Neck: normal jugular venous A waves present, normal jugular venous V waves present and no jugular venous novak A waves.   Pulmonary: no respiratory distress, normal respiratory rhythm and effort, no accessory muscle use and lungs were clear to auscultation bilaterally.   Cardiovascular: heart rate and rhythm were normal, normal S1 and S2. 3/6 systolic ejection murmur at the base of the heart present.  Abdomen: soft, non-tender, no hepato-splenomegaly and no abdominal mass palpated.   Musculoskeletal: the gait could not be assessed..   Extremities: no clubbing of the fingernails, no localized cyanosis, no petechial hemorrhages and no ischemic changes.   Skin: normal skin color and pigmentation, no rash, no venous stasis, no skin lesions, no skin ulcer and no xanthoma was observed.   Psychiatric: oriented to person, place, and time, the affect was normal, the mood was normal and not feeling anxious.      LABORATORY:                          11.3   10.81 )-----------( 220      ( 2020 10:53 )             34.0     02-14    141  |  109<H>  |  10  ----------------------------<  167<H>  3.4<L>   |  27  |  0.72    Ca    8.2<L>      2020 10:53    TPro  7.7  /  Alb  3.6  /  TBili  0.8  /  DBili  x   /  AST  27  /  ALT  19  /  AlkPhos  108  02-14      CARDIAC MARKERS ( 2020 02:06 )  <.015 ng/mL / x     / x     / x     / x            LIVER FUNCTIONS - ( 2020 02:06 )  Alb: 3.6 g/dL / Pro: 7.7 gm/dL / ALK PHOS: 108 U/L / ALT: 19 U/L / AST: 27 U/L / GGT: x             Urinalysis Basic - ( 2020 08:50 )    Color: Yellow / Appearance: Clear / S.010 / pH: x  Gluc: x / Ketone: Small  / Bili: Negative / Urobili: Negative mg/dL   Blood: x / Protein: Negative mg/dL / Nitrite: Negative   Leuk Esterase: Negative / RBC: 3-5 /HPF / WBC x   Sq Epi: x / Non Sq Epi: Occasional / Bacteria: x    IMAGING:    < from: CT Abdomen and Pelvis w/ IV Cont (20 @ 02:54) >    High-grade small bowel obstruction with transition point at the level of the umbilicus.    < end of copied text >    < from: Xray Chest 1 View- PORTABLE-Urgent (20 @ 06:15) >  EXAM:  XR CHEST PORTABLE URGENT 1V                            PROCEDURE DATE:  2020          INTERPRETATION:  AP erect chest on 2020 4:42 AM. Patient had NG tube insertion. CAT scan of the same day showed small bowel obstruction.    COMPARISON: None available.    Slight diffuse right thoracic curve and mild mid left lumbar curve noted.    Heart is magnified by technique.    The lung fields and pleural surfaces are unremarkable.    An NG tube is in place.    IMPRESSION: NG tube in place.    < end of copied text >    ASSESSMENT:  She is a pleasant 55-year-old female with known aortic stenosis seen by outpatient cardiology Dr. Almonte with last study perhaps a year and half ago. Also carries diagnoses of GERD, hiatal hernia, C-sections, hyperlipidemia and presents with epigastric pain for several days associated with nausea. Found to have a small bowel obstruction, she is currently getting NG tube gastric suction and bowel rest. Recent treatment with antibiotics for UTI also noted. No current chest complaints. Story obtained also from the patient's son at bedside. No recent chest complaints with activities of daily living however some shortness of breath noted to more moderate intensity activities.    PLAN:       Continue with GI/surgical care management. Decisions on perhaps operative interventions that surgical discretion. DVT prevention, PPI infusion, NG tube decompression and IV fluid hydration to be continued.    To further cardiac risk stratify, I have ordered an echocardiogram to assess LV function and valvular status. Clinical exam suggests moderate aortic valve stenosis at present, however echocardiogram data to follow.    Will follow along with you.    Too Joy MD, FACC, ANTONY, MAGED, FACP  Director, Heart Failure Services  [facility]  , Department of Cardiology  [facility]

## 2020-02-14 NOTE — ED PROVIDER NOTE - CLINICAL SUMMARY MEDICAL DECISION MAKING FREE TEXT BOX
Patient with abdominal pain, vomiting, found to have high grade SBO.  VSS.  NGT in place.  D/w gen surgery.  No evidence of acute abdominal infection.  Patient is to be admitted to the hospital and the case was discussed with the admitting physician.  Any changes in plan, additional imaging/labs, and further work up will be at the discretion of the admitting physician.

## 2020-02-14 NOTE — H&P ADULT - ATTENDING COMMENTS
I saw and examined the patient at bedside. She has an umbilical hernia and prior  incision. FIORELLA Ruiz was able to reduce a fat-containing umbilical hernia yesterday. The patient states that following this maneuver, she immediately had relief from the abdominal pain. She currently denies any abdominal pain, is non-TTP in all quadrants, no rebound/guarding. I have personally reviewed the laboratory and imaging findings, which are significant for a leukocytosis, and imaging showing what appears to be SBO at the level of the umbilicus. However lactate is normal, and clinically she has a benign abdomen. Given these findings, it is unclear whether her initial symptoms were a result of the umbilical hernia vs SBO. At this time NGT that was placed has minimal output. Will provide contrast and follow with Abdominal XR, repeat labs, continue resuscitation. No acute surgical intervention at this present time. The plan was discussed with the patient and her daughter, both who were agreeable to it. All their questions were answered.

## 2020-02-15 LAB
CULTURE RESULTS: NO GROWTH — SIGNIFICANT CHANGE UP
SPECIMEN SOURCE: SIGNIFICANT CHANGE UP

## 2020-02-15 PROCEDURE — 99232 SBSQ HOSP IP/OBS MODERATE 35: CPT

## 2020-02-15 PROCEDURE — 99233 SBSQ HOSP IP/OBS HIGH 50: CPT

## 2020-02-15 RX ORDER — ACETAMINOPHEN 500 MG
650 TABLET ORAL EVERY 6 HOURS
Refills: 0 | Status: DISCONTINUED | OUTPATIENT
Start: 2020-02-15 | End: 2020-02-16

## 2020-02-15 RX ORDER — POTASSIUM CHLORIDE 20 MEQ
40 PACKET (EA) ORAL ONCE
Refills: 0 | Status: DISCONTINUED | OUTPATIENT
Start: 2020-02-15 | End: 2020-02-15

## 2020-02-15 RX ADMIN — SODIUM CHLORIDE 110 MILLILITER(S): 9 INJECTION, SOLUTION INTRAVENOUS at 23:41

## 2020-02-15 RX ADMIN — SODIUM CHLORIDE 110 MILLILITER(S): 9 INJECTION, SOLUTION INTRAVENOUS at 12:08

## 2020-02-15 RX ADMIN — Medication 650 MILLIGRAM(S): at 22:10

## 2020-02-15 RX ADMIN — HEPARIN SODIUM 5000 UNIT(S): 5000 INJECTION INTRAVENOUS; SUBCUTANEOUS at 17:47

## 2020-02-15 RX ADMIN — Medication 650 MILLIGRAM(S): at 23:10

## 2020-02-15 RX ADMIN — SODIUM CHLORIDE 110 MILLILITER(S): 9 INJECTION, SOLUTION INTRAVENOUS at 05:42

## 2020-02-15 RX ADMIN — HEPARIN SODIUM 5000 UNIT(S): 5000 INJECTION INTRAVENOUS; SUBCUTANEOUS at 05:43

## 2020-02-15 RX ADMIN — PANTOPRAZOLE SODIUM 40 MILLIGRAM(S): 20 TABLET, DELAYED RELEASE ORAL at 12:17

## 2020-02-15 NOTE — DISCHARGE NOTE PROVIDER - HOSPITAL COURSE
56 y/o female PMHx aortic stenosis, GERD, hiatal hernia, , HLD admitted with high grade SBO. Umbilical hernia reduced. Patient was treated conservatively with NGT decompression. Follow up xray showed contrast in the colon. Bowel function returned and patient tolerated advancement of diet. Cardiology and medicine were consulted.     At the time of discharge, the patient was hemodynamically stable, was tolerating PO diet, was voiding urine, was ambulating, and was comfortable with adequate pain control. 56 y/o female PMHx aortic stenosis, GERD, hiatal hernia, , HLD admitted with high grade SBO. Umbilical hernia reduced. Patient was treated conservatively with NGT decompression. Follow up xray showed contrast in the colon. Bowel function returned and patient tolerated advancement of diet. Cardiology and medicine were consulted.     At the time of discharge, the patient was hemodynamically stable, was tolerating PO diet, was voiding urine, was ambulating, and was comfortable with adequate pain control. She was transferred to medicine service for further w-up/management by cardiology.   She is cleared by cardiology for discharge home.        It took 35 minutes to discharge.

## 2020-02-15 NOTE — DISCHARGE NOTE PROVIDER - CARE PROVIDER_API CALL
Quique Lyons)  Surgery  [address] [place] [address]  Phone: [phone]  Fax: [phone]  Follow Up Time: Quique Lyons)  Surgery  [address] [facility] [address] [place] [address]  Phone: [phone]  Fax: [phone]  Follow Up Time:     Robb Miranda)  Cardiovascular Disease; Internal Medicine  [address] [place] [address]  Phone: [phone]  Fax: [phone]  Follow Up Time: 1 week    PCP,   Phone: (   )    -  Fax: (   )    -  Follow Up Time: 1 week

## 2020-02-15 NOTE — DISCHARGE NOTE PROVIDER - PROVIDER TOKENS
PROVIDER:[TOKEN:[[id number]]] PROVIDER:[TOKEN:[[id number]]],PROVIDER:[TOKEN:[[id number]],FOLLOWUP:[1 week]],FREE:[LAST:[PCP],PHONE:[(   )    -],FAX:[(   )    -],FOLLOWUP:[1 week]]

## 2020-02-15 NOTE — PROGRESS NOTE ADULT - SUBJECTIVE AND OBJECTIVE BOX
SURGERY PROGRESS HPI:  Pt seen and examined at bedside. Denies abdominal pain. Pt denies complaints. No acute events overnight. Pt tolerating full liquid diet. Pt denies nausea and vomiting. +BM/flatus. Voiding well. Pt denies chest pain, SOB, dizziness, fever, chills. Ambulating.    Vital Signs Last 24 Hrs  T(C): 36.6 (15 Feb 2020 05:49), Max: 37.1 (2020 09:45)  T(F): 97.9 (15 Feb 2020 05:49), Max: 98.8 (2020 09:45)  HR: 69 (15 Feb 2020 05:49) (65 - 83)  BP: 116/74 (15 Feb 2020 05:49) (111/72 - 127/81)  BP(mean): --  RR: 17 (15 Feb 2020 05:49) (17 - 19)  SpO2: 97% (15 Feb 2020 05:49) (96% - 99%)      PHYSICAL EXAM:    GENERAL: NAD  CHEST/LUNG: Clear to ausculation, bilaterally   HEART: RRR S1S2  ABDOMEN: non distended, +BS, soft, non tender, no guarding  EXTREMITIES:  calf soft, non tender b/l    I&O's Detail      LABS:                        11.3   10.81 )-----------( 220      ( 2020 10:53 )             34.0     02-14    141  |  109<H>  |  10  ----------------------------<  167<H>  3.4<L>   |  27  |  0.72    Ca    8.2<L>      2020 10:53    TPro  7.7  /  Alb  3.6  /  TBili  0.8  /  DBili  x   /  AST  27  /  ALT  19  /  AlkPhos  108  02-14      Urinalysis Basic - ( 2020 08:50 )    Color: Yellow / Appearance: Clear / S.010 / pH: x  Gluc: x / Ketone: Small  / Bili: Negative / Urobili: Negative mg/dL   Blood: x / Protein: Negative mg/dL / Nitrite: Negative   Leuk Esterase: Negative / RBC: 3-5 /HPF / WBC x   Sq Epi: x / Non Sq Epi: Occasional / Bacteria: x        Assessment: 54y/o with H/O Aortic Stenosis, HLD, GERD, hiatal hernia, PSH of c-sections admitted with high grade SBO. Xray shows contrast in the colon. Pt tolerating full liquid diet. +BM.     Plan:  -Advance diet  -continue DVT prophylaxis, OOB, Ambulating   -will discuss pt with surgical attending

## 2020-02-15 NOTE — PROGRESS NOTE ADULT - SUBJECTIVE AND OBJECTIVE BOX
Patient is a 55y old  Female who presents with a chief complaint of High grade SBO (15 Feb 2020 06:13), no abdominal pain       OVERNIGHT EVENTS: none    MEDICATIONS  (STANDING):  dextrose 5% + sodium chloride 0.45%. 1000 milliLiter(s) (110 mL/Hr) IV Continuous <Continuous>  heparin  Injectable 5000 Unit(s) SubCutaneous every 12 hours  pantoprazole  Injectable 40 milliGRAM(s) IV Push daily    MEDICATIONS  (PRN):  ondansetron Injectable 4 milliGRAM(s) IV Push every 6 hours PRN Nausea        REVIEW OF SYSTEMS: no pain.    Family member at the bedside.     Vital Signs Last 24 Hrs  T(C): 36.6 (15 Feb 2020 05:49), Max: 36.9 (2020 23:18)  T(F): 97.9 (15 Feb 2020 05:49), Max: 98.5 (2020 23:18)  HR: 69 (15 Feb 2020 05:49) (65 - 70)  BP: 116/74 (15 Feb 2020 05:49) (116/74 - 127/81)  BP(mean): --  RR: 17 (15 Feb 2020 05:49) (17 - 17)  SpO2: 97% (15 Feb 2020 05:49) (97% - 99%)    PHYSICAL EXAM:  GENERAL: NAD, well-groomed, well-developed  HEAD:  Atraumatic, Normocephalic  EYES: EOMI, PERRLA, conjunctiva and sclera clear  ENMT: No tonsillar erythema, exudates, or enlargement; Moist mucous membranes   NECK: Supple, No JVD   NERVOUS SYSTEM:  Alert & Oriented X3, Good concentration; Motor Strength 5/5 B/L upper and lower extremities;    CHEST/LUNG: Clear to auscultation  bilaterally; No rales, rhonchi, wheezing, or rubs  HEART: Regular rate and rhythm; No murmurs, rubs, or gallops  ABDOMEN: Soft, Nontender, Nondistended; Bowel sounds present  EXTREMITIES:  2+ Peripheral Pulses, No clubbing, cyanosis, or edema  LYMPH: No lymphadenopathy noted  SKIN: No rashes or lesions    LABS:                        11.3   10.81 )-----------( 220      ( 2020 10:53 )             34.0     02-14    141  |  109<H>  |  10  ----------------------------<  167<H>  3.4<L>   |  27  |  0.72    Ca    8.2<L>      2020 10:53    TPro  7.7  /  Alb  3.6  /  TBili  0.8  /  DBili  x   /  AST  27  /  ALT  19  /  AlkPhos  108  02-14       cardiac markers   Urinalysis Basic - ( 2020 08:50 )    Color: Yellow / Appearance: Clear / S.010 / pH: x  Gluc: x / Ketone: Small  / Bili: Negative / Urobili: Negative mg/dL   Blood: x / Protein: Negative mg/dL / Nitrite: Negative   Leuk Esterase: Negative / RBC: 3-5 /HPF / WBC x   Sq Epi: x / Non Sq Epi: Occasional / Bacteria: x      CAPILLARY BLOOD GLUCOSE        Cultures    RADIOLOGY & ADDITIONAL TESTS:    Imaging Personally Reviewed:  [ ] YES  [ ] NO    Consultant(s) Notes Reviewed:  [x ] YES  [ ] NO    Care Discussed with Consultants/Other Providers [ ] YES  [ ] NO

## 2020-02-15 NOTE — PROGRESS NOTE ADULT - ASSESSMENT
56y/o female with history Aortic Stenosis, HLD, GERD, hiatal hernia, PSH of c-sections presents to ER with c/o epigastric pain x 3 days.  Pt states she first had nausea then abdominal pain that has gotten worse. Pain is now 6/10, nonradiating had one episode of nonbloody emesis in ER.   Patient has been taking naproxen for arthritis, took omeprazole at home without relief. Pt is followed by GI (Dr. Bledsoe), last colonoscopy and EGD within the last yr. Pt seen by Dr. Almonte (cardiology). Last BM and flatus was last night, also last meal. Pt recently treated for UTI with 5days of cipro.   She is admitted to the surgical service for high grade SBO, a medical consult was requested for management of co-morbidities while in the hospital.   She denies diarrhea, dyspnea, chest pain.    A/p:  High grade SBO - resolved  - NGT - was d/c'ed  - IVF hydration.  - pain meds as needed  - management as per surgery.  - on clear liquid, advance as tolerated    Leukocytosis: resolving  - likely secondary to acute illness, UA is negative.  - will monitor.    HLD  - follow up lipid profile in AM if not discharged today.    H/o AS.  - ECHO  - cardiology eval.    Hypokalemia  - replete potassium.    FOllow up patient.

## 2020-02-15 NOTE — PROGRESS NOTE ADULT - SUBJECTIVE AND OBJECTIVE BOX
CHIEF COMPLAINT:  Patient is a 55y old  Female who presents with a chief complaint of SBO (2020 10:12)      HPI:  She is a pleasant 55-year-old female with known aortic stenosis seen by outpatient cardiology Dr. Almonte with last study perhaps a year and half ago. Also carries diagnoses of GERD, hiatal hernia, C-sections, hyperlipidemia and presents with epigastric pain for several days associated with nausea. Found to have a small bowel obstruction, she is currently getting NG tube gastric suction and bowel rest. Recent treatment with antibiotics for UTI also noted. No current chest complaints. Story obtained also from the patient's daughter at bedside. No recent chest complaints with activities of daily living however some shortness of breath noted to more moderate intensity activities.    ALLERGIES:  Allergies    avocado (Angioedema)  No Known Drug Allergies    MEDICATIONS  (STANDING):  dextrose 5% + sodium chloride 0.45%. 1000 milliLiter(s) (110 mL/Hr) IV Continuous <Continuous>  heparin  Injectable 5000 Unit(s) SubCutaneous every 12 hours  pantoprazole  Injectable 40 milliGRAM(s) IV Push daily    MEDICATIONS  (PRN):  ondansetron Injectable 4 milliGRAM(s) IV Push every 6 hours PRN Nausea      Home Medications:  Unclear     PAST MEDICAL & SURGICAL HISTORY:  Hiatal hernia with GERD  High cholesterol  History of aortic stenosis  Acid reflux  H/O:   S/P  section: x 2        FAMILY HISTORY:  FH: CVA (cerebrovascular accident)      SOCIAL HISTORY:  no tobacco noted.    REVIEW OF SYSTEMS:  General:  No wt loss, fevers, chills, night sweats  Eyes:  Good vision, no reported pain  ENT:  No sore throat, pain, runny nose, dysphagia  CV:  No pain, palpitations, hypo/hypertension  Resp:  No dyspnea, cough, tachypnea, wheezing  GI:  No pain, nausea, vomiting, diarrhea, constipation  :  No pain, bleeding, incontinence, nocturia  Muscle:  No pain, weakness  Breast:  No pain, abscess, mass, discharge  Neuro:  No weakness, tingling, memory problems  Psych:  No fatigue, insomnia, mood problems, depression  Endocrine:  No polyuria, polydipsia, cold/heat intolerance  Heme:  No petechiae, ecchymosis, easy bruisability  Skin:  No rash, tattoos, scars, edema    PHYSICAL EXAM:  Vital Signs Last 24 Hrs  T(C): 36.3 (15 Feb 2020 11:34), Max: 36.9 (2020 23:18)  T(F): 97.3 (15 Feb 2020 11:34), Max: 98.5 (2020 23:18)  HR: 67 (15 Feb 2020 11:34) (65 - 70)  BP: 126/73 (15 Feb 2020 11:34) (116/74 - 127/81)  RR: 16 (15 Feb 2020 11:34) (16 - 17)  SpO2: 97% (15 Feb 2020 11:34) (97% - 99%)      Constitutional: well developed, normal appearance, well groomed, well nourished, no deformities and no acute distress.   Eyes: the conjunctiva exhibited no abnormalities and the eyelids demonstrated no xanthelasmas.   HEENT: normal oral mucosa, no oral pallor and no oral cyanosis.   Neck: normal jugular venous A waves present, normal jugular venous V waves present and no jugular venous novak A waves.   Pulmonary: no respiratory distress, normal respiratory rhythm and effort, no accessory muscle use and lungs were clear to auscultation bilaterally.   Cardiovascular: heart rate and rhythm were normal, normal S1 and S2. 3/6 systolic ejection murmur at the base of the heart present.  Abdomen: soft, non-tender, no hepato-splenomegaly and no abdominal mass palpated.   Musculoskeletal: the gait could not be assessed..   Extremities: no clubbing of the fingernails, no localized cyanosis, no petechial hemorrhages and no ischemic changes.   Skin: normal skin color and pigmentation, no rash, no venous stasis, no skin lesions, no skin ulcer and no xanthoma was observed.   Psychiatric: oriented to person, place, and time, the affect was normal, the mood was normal and not feeling anxious.      LABORATORY:                                     11.3   10.81 )-----------( 220      ( 2020 10:53 )             34.0   02-14    141  |  109<H>  |  10  ----------------------------<  167<H>  3.4<L>   |  27  |  0.72    Ca    8.2<L>      2020 10:53    TPro  7.7  /  Alb  3.6  /  TBili  0.8  /  DBili  x   /  AST  27  /  ALT  19  /  AlkPhos  108          IMAGING:    < from: CT Abdomen and Pelvis w/ IV Cont (20 @ 02:54) >    High-grade small bowel obstruction with transition point at the level of the umbilicus.    < end of copied text >    < from: Xray Chest 1 View- PORTABLE-Urgent (20 @ 06:15) >  EXAM:  XR CHEST PORTABLE URGENT 1V                            PROCEDURE DATE:  2020          INTERPRETATION:  AP erect chest on 2020 4:42 AM. Patient had NG tube insertion. CAT scan of the same day showed small bowel obstruction.    COMPARISON: None available.    Slight diffuse right thoracic curve and mild mid left lumbar curve noted.    Heart is magnified by technique.    The lung fields and pleural surfaces are unremarkable.    An NG tube is in place.    IMPRESSION: NG tube in place.    < end of copied text >

## 2020-02-15 NOTE — PROGRESS NOTE ADULT - ASSESSMENT
55-year-old female with known aortic stenosis seen by outpatient cardiology Dr. Almonte with last study perhaps a year and half ago. Also carries diagnoses of GERD, hiatal hernia, C-sections, hyperlipidemia and presents with epigastric pain for several days associated with nausea. Found to have a small bowel obstruction, she is currently getting NG tube gastric suction and bowel rest. Recent treatment with antibiotics for UTI also noted. No current chest complaints. Story obtained also from the patient's son at bedside. No recent chest complaints with activities of daily living however some shortness of breath noted to more moderate intensity activities.       -Continue with GI/surgical care management.   -2D echo pending today; if AS moderate, ok to d/c home with outpt follow-up. If AS severe, would continue to monitor and send for AVR when stable from a GI standpoint.

## 2020-02-15 NOTE — DISCHARGE NOTE PROVIDER - NSDCCPCAREPLAN_GEN_ALL_CORE_FT
PRINCIPAL DISCHARGE DIAGNOSIS  Diagnosis: Small bowel obstruction  Assessment and Plan of Treatment: PRINCIPAL DISCHARGE DIAGNOSIS  Diagnosis: Small bowel obstruction  Assessment and Plan of Treatment: - resolved      SECONDARY DISCHARGE DIAGNOSES  Diagnosis: Aortic stenosis, severe  Assessment and Plan of Treatment: - she will follow up with Dr. Miranda within a week, cardiology.

## 2020-02-16 ENCOUNTER — TRANSCRIPTION ENCOUNTER (OUTPATIENT)
Age: 55
End: 2020-02-16

## 2020-02-16 VITALS
RESPIRATION RATE: 16 BRPM | DIASTOLIC BLOOD PRESSURE: 78 MMHG | OXYGEN SATURATION: 97 % | SYSTOLIC BLOOD PRESSURE: 121 MMHG | TEMPERATURE: 98 F | HEART RATE: 69 BPM

## 2020-02-16 LAB
CHOLEST SERPL-MCNC: 140 MG/DL — SIGNIFICANT CHANGE UP (ref 10–199)
HDLC SERPL-MCNC: 41 MG/DL — LOW
LIPID PNL WITH DIRECT LDL SERPL: 60 MG/DL — SIGNIFICANT CHANGE UP
TOTAL CHOLESTEROL/HDL RATIO MEASUREMENT: 3.4 RATIO — SIGNIFICANT CHANGE UP (ref 3.3–7.1)
TRIGL SERPL-MCNC: 190 MG/DL — HIGH (ref 10–149)

## 2020-02-16 PROCEDURE — 99232 SBSQ HOSP IP/OBS MODERATE 35: CPT

## 2020-02-16 PROCEDURE — 99239 HOSP IP/OBS DSCHRG MGMT >30: CPT

## 2020-02-16 RX ORDER — INFLUENZA VIRUS VACCINE 15; 15; 15; 15 UG/.5ML; UG/.5ML; UG/.5ML; UG/.5ML
0.5 SUSPENSION INTRAMUSCULAR ONCE
Refills: 0 | Status: COMPLETED | OUTPATIENT
Start: 2020-02-16 | End: 2020-02-16

## 2020-02-16 RX ADMIN — INFLUENZA VIRUS VACCINE 0.5 MILLILITER(S): 15; 15; 15; 15 SUSPENSION INTRAMUSCULAR at 12:28

## 2020-02-16 RX ADMIN — PANTOPRAZOLE SODIUM 40 MILLIGRAM(S): 20 TABLET, DELAYED RELEASE ORAL at 11:10

## 2020-02-16 RX ADMIN — HEPARIN SODIUM 5000 UNIT(S): 5000 INJECTION INTRAVENOUS; SUBCUTANEOUS at 06:52

## 2020-02-16 NOTE — DISCHARGE NOTE NURSING/CASE MANAGEMENT/SOCIAL WORK - PATIENT PORTAL LINK FT
You can access the FollowMyHealth Patient Portal offered by [facility] by registering at the following website: [email or web address]. By joining [employer]’s FollowMyHealth portal, you will also be able to view your health information using other applications (apps) compatible with our system.

## 2020-02-16 NOTE — PROGRESS NOTE ADULT - ASSESSMENT
55-year-old female with known aortic stenosis seen by outpatient cardiology Dr. Almonte with last study perhaps a year and half ago. Also carries diagnoses of GERD, hiatal hernia, C-sections, hyperlipidemia and presents with epigastric pain for several days associated with nausea. Found to have a small bowel obstruction, she is currently getting NG tube gastric suction and bowel rest. Recent treatment with antibiotics for UTI also noted. No current chest complaints. Story obtained also from the patient's son at bedside. No recent chest complaints with activities of daily living however some shortness of breath noted to more moderate intensity activities.       Severe AS on echo with DESIRAE 0.5 and gradient 55mmHg  Discussed with family at bedside; pt with critical AS.  Needs eval ASAP.   Would like to go home and follow at [facility]... daughter PA there.  OK to d/c with follow-up THIS tues/wed.  Aware of risks including SCD.

## 2020-02-16 NOTE — PROGRESS NOTE ADULT - SUBJECTIVE AND OBJECTIVE BOX
CHIEF COMPLAINT:  Patient is a 55y old  Female who presents with a chief complaint of SBO (2020 10:12)      HPI:  She is a pleasant 55-year-old female with known aortic stenosis seen by outpatient cardiology Dr. Almonte with last study perhaps a year and half ago. Also carries diagnoses of GERD, hiatal hernia, C-sections, hyperlipidemia and presents with epigastric pain for several days associated with nausea. Found to have a small bowel obstruction, she is currently getting NG tube gastric suction and bowel rest. Recent treatment with antibiotics for UTI also noted. No current chest complaints. Story obtained also from the patient's daughter at bedside. No recent chest complaints with activities of daily living however some shortness of breath noted to more moderate intensity activities.    Echo with severe AS.      ALLERGIES:  Allergies    avocado (Angioedema)  No Known Drug Allergies    MEDICATIONS  (STANDING):  dextrose 5% + sodium chloride 0.45%. 1000 milliLiter(s) (110 mL/Hr) IV Continuous <Continuous>  heparin  Injectable 5000 Unit(s) SubCutaneous every 12 hours  pantoprazole  Injectable 40 milliGRAM(s) IV Push daily    MEDICATIONS  (PRN):  ondansetron Injectable 4 milliGRAM(s) IV Push every 6 hours PRN Nausea      Home Medications:  Unclear     PAST MEDICAL & SURGICAL HISTORY:  Hiatal hernia with GERD  High cholesterol  History of aortic stenosis  Acid reflux  H/O:   S/P  section: x 2        FAMILY HISTORY:  FH: CVA (cerebrovascular accident)      SOCIAL HISTORY:  no tobacco noted.    REVIEW OF SYSTEMS:  General:  No wt loss, fevers, chills, night sweats  Eyes:  Good vision, no reported pain  ENT:  No sore throat, pain, runny nose, dysphagia  CV:  No pain, palpitations, hypo/hypertension  Resp:  No dyspnea, cough, tachypnea, wheezing  GI:  No pain, nausea, vomiting, diarrhea, constipation  :  No pain, bleeding, incontinence, nocturia  Muscle:  No pain, weakness  Breast:  No pain, abscess, mass, discharge  Neuro:  No weakness, tingling, memory problems  Psych:  No fatigue, insomnia, mood problems, depression  Endocrine:  No polyuria, polydipsia, cold/heat intolerance  Heme:  No petechiae, ecchymosis, easy bruisability  Skin:  No rash, tattoos, scars, edema    PHYSICAL EXAM:  Vital Signs Last 24 Hrs  T(C): 36.7 (2020 10:50), Max: 37.3 (15 Feb 2020 16:40)  T(F): 98.1 (2020 10:50), Max: 99.2 (15 Feb 2020 16:40)  HR: 69 (2020 10:50) (68 - 74)  BP: 121/78 (2020 10:50) (119/71 - 134/76)  BP(mean): --  RR: 16 (2020 10:50) (15 - 17)  SpO2: 97% (2020 10:50) (96% - 98%)      Constitutional: well developed, normal appearance, well groomed, well nourished, no deformities and no acute distress.   Eyes: the conjunctiva exhibited no abnormalities and the eyelids demonstrated no xanthelasmas.   HEENT: normal oral mucosa, no oral pallor and no oral cyanosis.   Neck: normal jugular venous A waves present, normal jugular venous V waves present and no jugular venous novak A waves.   Pulmonary: no respiratory distress, normal respiratory rhythm and effort, no accessory muscle use and lungs were clear to auscultation bilaterally.   Cardiovascular: heart rate and rhythm were normal, normal S1 and S2. 3/6 systolic ejection murmur at the base of the heart present.  Abdomen: soft, non-tender, no hepato-splenomegaly and no abdominal mass palpated.   Musculoskeletal: the gait could not be assessed..   Extremities: no clubbing of the fingernails, no localized cyanosis, no petechial hemorrhages and no ischemic changes.   Skin: normal skin color and pigmentation, no rash, no venous stasis, no skin lesions, no skin ulcer and no xanthoma was observed.   Psychiatric: oriented to person, place, and time, the affect was normal, the mood was normal and not feeling anxious.      LABORATORY:            IMAGING:    < from: CT Abdomen and Pelvis w/ IV Cont (20 @ 02:54) >    High-grade small bowel obstruction with transition point at the level of the umbilicus.    < end of copied text >    < from: Xray Chest 1 View- PORTABLE-Urgent (20 @ 06:15) >  EXAM:  XR CHEST PORTABLE URGENT 1V                            PROCEDURE DATE:  2020          INTERPRETATION:  AP erect chest on 2020 4:42 AM. Patient had NG tube insertion. CAT scan of the same day showed small bowel obstruction.    COMPARISON: None available.    Slight diffuse right thoracic curve and mild mid left lumbar curve noted.    Heart is magnified by technique.    The lung fields and pleural surfaces are unremarkable.    An NG tube is in place.    IMPRESSION: NG tube in place.    < end of copied text >      < from: TTE Echo Doppler w/o Cont (02.15.20 @ 14:26) >  Summary:   1. Left ventricular ejection fraction, by visual estimation, is 60 to 65%.   2. There is mild septal left ventricular hypertrophy.   3. Mild mitral annular calcification.   4. Mild thickening of the anterior and posterior mitral valve leaflets.   5. Peak transaortic gradient equals 94.0 mmHg, mean transaortic gradient equals 53.2 mmHg, the calculated aortic valve area equals 0.57 cm² by the continuity equation consistent with severe aortic stenosis.      < end of copied text >

## 2020-02-16 NOTE — PROGRESS NOTE ADULT - SUBJECTIVE AND OBJECTIVE BOX
Patient seen and examined at bedside with Dr. Briseno.  Patient's daughter bedside.  No complaints offered. Tolerating regular diet.  +Flatus.  Denies fever, chills, chest pain, sob.    Vital Signs Last 24 Hrs  T(F): 98.2 (02-16-20 @ 05:15), Max: 99.2 (02-15-20 @ 16:40)  HR: 68 (02-16-20 @ 05:15)  BP: 134/76 (02-16-20 @ 05:15)  RR: 15 (02-16-20 @ 05:15)  SpO2: 98% (02-16-20 @ 05:15)    GENERAL: Alert, oriented, NAD  CHEST/LUNG: Clear to auscultation bilaterally, respirations nonlabored  HEART: S1S2, RRR  ABDOMEN: + Bowel sounds, soft, NTND      LABS:  No new labs      A/P: 55F PMH AS a/w SBO, resolved. TTE: severe aortic stenosis  - cardiology note appreciated; patient transferred to medical service for monitoring and management of severe AS  - surgically stable for cardio procedure  - no surgical intervention  - continue diet as tolerated  - discussed with Dr. Briseno and Dr. William

## 2020-02-21 DIAGNOSIS — E78.5 HYPERLIPIDEMIA, UNSPECIFIED: ICD-10-CM

## 2020-02-21 DIAGNOSIS — K56.609 UNSPECIFIED INTESTINAL OBSTRUCTION, UNSPECIFIED AS TO PARTIAL VERSUS COMPLETE OBSTRUCTION: ICD-10-CM

## 2020-02-21 DIAGNOSIS — K21.9 GASTRO-ESOPHAGEAL REFLUX DISEASE WITHOUT ESOPHAGITIS: ICD-10-CM

## 2020-02-21 DIAGNOSIS — E87.6 HYPOKALEMIA: ICD-10-CM

## 2020-02-21 DIAGNOSIS — D72.829 ELEVATED WHITE BLOOD CELL COUNT, UNSPECIFIED: ICD-10-CM

## 2020-02-21 DIAGNOSIS — K42.9 UMBILICAL HERNIA WITHOUT OBSTRUCTION OR GANGRENE: ICD-10-CM

## 2020-02-21 DIAGNOSIS — I35.0 NONRHEUMATIC AORTIC (VALVE) STENOSIS: ICD-10-CM

## 2020-09-16 PROBLEM — E78.00 PURE HYPERCHOLESTEROLEMIA, UNSPECIFIED: Chronic | Status: ACTIVE | Noted: 2020-02-14

## 2020-09-16 PROBLEM — Z86.79 PERSONAL HISTORY OF OTHER DISEASES OF THE CIRCULATORY SYSTEM: Chronic | Status: ACTIVE | Noted: 2020-02-14

## 2020-09-16 PROBLEM — K21.9 GASTRO-ESOPHAGEAL REFLUX DISEASE WITHOUT ESOPHAGITIS: Chronic | Status: ACTIVE | Noted: 2020-02-14

## 2020-09-24 ENCOUNTER — APPOINTMENT (OUTPATIENT)
Dept: ORTHOPEDIC SURGERY | Facility: CLINIC | Age: 55
End: 2020-09-24
Payer: COMMERCIAL

## 2020-09-24 VITALS
HEART RATE: 77 BPM | WEIGHT: 175 LBS | BODY MASS INDEX: 32.2 KG/M2 | DIASTOLIC BLOOD PRESSURE: 81 MMHG | SYSTOLIC BLOOD PRESSURE: 140 MMHG | TEMPERATURE: 97.9 F | HEIGHT: 62 IN

## 2020-09-24 DIAGNOSIS — M17.0 BILATERAL PRIMARY OSTEOARTHRITIS OF KNEE: ICD-10-CM

## 2020-09-24 DIAGNOSIS — M23.304 OTHER MENISCUS DERANGEMENTS, UNSPECIFIED MEDIAL MENISCUS, LEFT KNEE: ICD-10-CM

## 2020-09-24 PROCEDURE — 99213 OFFICE O/P EST LOW 20 MIN: CPT

## 2020-10-20 PROBLEM — M17.0 LOCALIZED OSTEOARTHRITIS OF KNEES, BILATERAL: Status: ACTIVE | Noted: 2020-01-17

## 2020-10-20 PROBLEM — M23.304 DERANGEMENT OF MEDIAL MENISCUS OF LEFT KNEE: Status: ACTIVE | Noted: 2020-01-17

## 2020-10-20 RX ORDER — HYALURONATE SODIUM 20 MG/2 ML
20 SYRINGE (ML) INTRAARTICULAR
Qty: 2 | Refills: 0 | Status: ACTIVE | COMMUNITY
Start: 2020-09-24

## 2020-10-20 NOTE — CONSULT LETTER
[Consult Letter:] : I had the pleasure of evaluating your patient, [unfilled]. [Please see my note below.] : Please see my note below. [Consult Closing:] : Thank you very much for allowing me to participate in the care of this patient.  If you have any questions, please do not hesitate to contact me. [Sincerely,] : Sincerely, [Dear  ___] : Dear  [unfilled], [FreeTextEntry2] : Dr. Robb Miranda,  [FreeTextEntry3] : Jarad Dominguez MD\par \par ______________________________________________\par [facility]: Hip/Knee Arthroplasty\par [address], [address], [place] [address]\par (t) [phone]\par (f) [phone]\par

## 2020-10-20 NOTE — PHYSICAL EXAM
[UE] : 5/5 motor strength in bilateral upper extremities [LE] : Sensory: Intact in bilateral lower extremities [Knee] : patellar 2+ and symmetric bilaterally [Ankle] : ankle 2+ and symmetric bilaterally [DP] : dorsalis pedis 2+ and symmetric bilaterally [PT] : posterior tibial 2+ and symmetric bilaterally [de-identified] : On general examination the patient is adequately groomed and nourished. The vital parameters are as recorded. \par There is no lymphedema, generalized swelling bilateral LE with varicose veins, no skin warmth/erythema/scars/swelling, no ulcers and no palpable lymph nodes or masses in both lower extremities. Bilateral pedal pulses are well palpable.\par Upper Extremity:\par Both right and left upper extremities are unremarkable in terms of skin rash, lesions, pigmentation, redness, tenderness, swelling, joint instability, abnormal deformity or crepitus. The overall range of motion, sensation, motor tone and strength testing are normal.\par \par Bilateral Knee Exam: \par The gait is bilateral stiff knee antalgic.\par Knee alignment:      slight valgus\par Both knees demonstrate no scars and the skin has no warmth, erythema, swelling or tenderness. \par Both knees have a range of motion of\par Extension:                    Right 0 degrees     Left 0 degrees\par Flexion:                                   Right 110 degrees      Left 100 degrees\par There is more pain with range of motion of the left knee than the right. \par There is right knee medial joint line tenderness, left knee medial joint line tenderness. There is left knee mild effusion. \par Fahad's test is positive and Chin test is positive on the left knee. Both are negative on the right. \par Lachman's test, Anterior/Posterior Drawer test and Pivot Shift Tests are negative. \par There is no mediolateral laxity and no anteroposterior instability. \par Patella compression test is positive and patellofemoral tracking is normal with no lateral subluxation, apprehension or instability. \par Right knee quadriceps and hamstrings power is 4+.\par Left knee quadriceps and hamstrings power is 4+.\par \par Hip Exam:\par The gait and station is normal\par The patient has equal leg lengths and no pelvic tilt. Hany/Adrienne test is 7 inches on the right and 7 inches on the left. Active SLR is 60 degrees on the right and 60 degrees on the left. Both hips demonstrate no scars and the skin has no signs of inflammation or tenderness. \par Both Hips have a normal range of motion of flexion to 100 degrees, abduction 40 degrees, adduction 20 degrees, external rotation 40 degrees, internal rotation 20 degrees with symmetrical motion in flexion and extension. There is no flexion contracture, deformity or instability. Labral impingement tests are negative.\par Both hips flexor, abductor and extensor power is normal.\par \par Spine Exam:\par There is mild curvature of the spine with loss of normal lumbar lordosis. The skin is devoid of erythema, scars, pigmentation or rashes. There is mild lumbar spasm and tenderness especially at L4-L5 or L5-S1. \par The range of motion of the lumbar spine is limited by pain and spasm. Forward flexion is 80% normal, extension catch positive, lateral flexion and rotation 80% normal. There is no lumbar spine imbalance or instability detected.\par Bilateral passive SLR is right 40 degrees, left 40 degrees in supine and sitting positions. Lasegue's Test is negative.\par Neurology:\par The patient is alert and oriented in person, place and time. The mood is calm and affect is normal.\par Testing for coordination including Rhomberg's Test and Finger-Nose Test, sensation, motor tone and power and deep tendon reflexes in both lower extremities is normal.\par  [de-identified] : The following radiographs were taken last visit by me, and reviewed again during this patients visit. I reviewed each radiograph in detail with the patient and discussed the findings as highlighted below. \par AP, lateral and skyline views of the bilateral knees confirm right knee mild to moderate DJD, with lateral joint line narrowing and osteophyte formation, left knee mild arthritis, with bone spurring lateral joint line. There is bilateral knee patellofemoral arthritis.  There is no sign of fracture bilateral knees. \par AP view of the pelvis are within normal limits\par \par MRI of the left knee reviewed, taken on 1/31/2020, reveals severe tricompartment osteoarthritis, with degenerative tearing of the medial and lateral meniscus. There is a moderate joint effusion.

## 2020-10-20 NOTE — HISTORY OF PRESENT ILLNESS
[Worsening] : worsening [Intermit.] : ~He/She~ states the symptoms seem to be intermittent [de-identified] : Ms. CISCO BARBER is a 55 year old female presents with bilateral knee pain, left more than right, beginning on 11/27/2019, now worsening. She was last seen in January 2020, and recommended for MRI of the left knee due to mechanical symptoms of the knee. She had the MRI of the knee, but did not return to the office for follow up due to the pandemic. She has been having continued pain to the knee. \par She also complains of right sided lower back with radiation to the Right LE posteriorly. \par She notes the pain is sharp, localized to the medial aspect of the knee, but can be present generalized anterior and posterior knee as well. She notes the pain is intermittent, and present when walking, and with extension of the knee. She notes she feels that something inside her left knee has flipped, causing the knee to lock up with extension. She denies this feeling in the right knee. She admits to an aspiration of the left knee with a steroid injection prior to seeing me the first time, with only mild relief for a short period. She admits to swelling of the knee. \par She denies any recent physical therapy. She notes she has relief with heat and rest. \par She presents for reevaluation. \par \par She admits to a prior history of arthritis of the knees. \par She denies family history of any musculoskeletal conditions. \par She denies use of tobacco, alcohol and recreational drugs. She denies exercise. She lives with her family in a house.

## 2020-10-20 NOTE — DISCUSSION/SUMMARY
[de-identified] : bilateral knee DJD, left knee degenerative tearing medial and lateral meniscus, with lumbar spine DJD.\par \par The natural history and treatment of degenerative arthritis was discussed with the patient at length today. The spectrum of treatment including nonoperative modalities to surgical intervention was elucidated. Noninvasive and nonoperative treatment modalities include weight reduction, activity modification with low impact exercise,  as needed use of acetaminophen or anti-inflammatory medications if tolerated, glucosamine/chondroitin supplements, and physical therapy. Further treatments can include corticosteroid injection and the use of viscosupplementation with hyaluronic acid injections. Definitive surgical treatment can certainly include total joint arthroplasty also. The risks and benefits of each treatment options was discussed and all questions were answered.\par The patient was informed of the findings and recommended conservative management in the form of a home exercise program, activity modifications, stationary bicycling, swimming and weight loss program. A trial of Glucosamine and Chondroiten Sulphate was recommended.\par A prescription for a course of physical therapy was provided for bilateral knees\par A prescription for non-steroidal anti-inflammatory medication - diclofenac was provided and the risks, benefits and side effects were discussed.\par I have recommended Euflexxa injections to the bilateral knee and the patient agreed to proceed, and the risks, benefits and side effects were discussed.Follow-up appointment was recommended once the injection is received in the office to begin the series\par At this time her lower back pain is also a concern, and she has been provided with a referral to Dr. Francisco Henderson for evaluation and treatment. \par

## 2020-10-22 ENCOUNTER — APPOINTMENT (OUTPATIENT)
Dept: CT IMAGING | Facility: CLINIC | Age: 55
End: 2020-10-22
Payer: COMMERCIAL

## 2020-10-22 ENCOUNTER — OUTPATIENT (OUTPATIENT)
Dept: OUTPATIENT SERVICES | Facility: HOSPITAL | Age: 55
LOS: 1 days | End: 2020-10-22
Payer: COMMERCIAL

## 2020-10-22 DIAGNOSIS — Z98.891 HISTORY OF UTERINE SCAR FROM PREVIOUS SURGERY: Chronic | ICD-10-CM

## 2020-10-22 DIAGNOSIS — Z00.8 ENCOUNTER FOR OTHER GENERAL EXAMINATION: ICD-10-CM

## 2020-10-22 DIAGNOSIS — Z98.89 OTHER SPECIFIED POSTPROCEDURAL STATES: Chronic | ICD-10-CM

## 2020-10-22 PROCEDURE — 72131 CT LUMBAR SPINE W/O DYE: CPT

## 2020-10-22 PROCEDURE — 72131 CT LUMBAR SPINE W/O DYE: CPT | Mod: 26

## 2020-12-01 ENCOUNTER — APPOINTMENT (OUTPATIENT)
Dept: NEUROSURGERY | Facility: CLINIC | Age: 55
End: 2020-12-01
Payer: COMMERCIAL

## 2020-12-01 VITALS — BODY MASS INDEX: 32.2 KG/M2 | HEIGHT: 62 IN | WEIGHT: 175 LBS

## 2020-12-01 VITALS — TEMPERATURE: 97.2 F

## 2020-12-01 PROCEDURE — 99072 ADDL SUPL MATRL&STAF TM PHE: CPT

## 2020-12-01 PROCEDURE — 99203 OFFICE O/P NEW LOW 30 MIN: CPT

## 2020-12-01 RX ORDER — DICLOFENAC SODIUM 75 MG/1
75 TABLET, DELAYED RELEASE ORAL
Qty: 1 | Refills: 1 | Status: DISCONTINUED | COMMUNITY
Start: 2020-01-17 | End: 2020-12-01

## 2020-12-01 RX ORDER — FAMOTIDINE 20 MG/1
20 TABLET, FILM COATED ORAL
Qty: 90 | Refills: 0 | Status: ACTIVE | COMMUNITY
Start: 2020-06-09

## 2020-12-01 RX ORDER — NAPROXEN SODIUM 220 MG
TABLET ORAL
Refills: 0 | Status: DISCONTINUED | COMMUNITY
End: 2020-12-01

## 2020-12-01 RX ORDER — OMEPRAZOLE 20 MG/1
TABLET, DELAYED RELEASE ORAL
Refills: 0 | Status: DISCONTINUED | COMMUNITY
End: 2020-12-01

## 2020-12-01 RX ORDER — WARFARIN 3 MG/1
3 TABLET ORAL
Qty: 180 | Refills: 0 | Status: ACTIVE | COMMUNITY
Start: 2020-11-24

## 2020-12-01 RX ORDER — LEVOTHYROXINE SODIUM 0.03 MG/1
25 TABLET ORAL
Qty: 90 | Refills: 0 | Status: ACTIVE | COMMUNITY
Start: 2020-07-11

## 2020-12-01 RX ORDER — AMIODARONE HYDROCHLORIDE 200 MG/1
200 TABLET ORAL
Qty: 90 | Refills: 0 | Status: ACTIVE | COMMUNITY
Start: 2020-08-27

## 2020-12-01 NOTE — ASSESSMENT
[FreeTextEntry1] : 55 year old female with low back and lumbar radicular pain secondary to stenosis.  Given the nature of the patient's complaints and lack of significant improvement following more conservative measures, we did discuss lumbar epidural steroid injection.  Risks, benefits, and expectations of the procedure were reviewed.  The patient was provided with an educational pamphlet outlining the details of the procedure so that he/she may have the ability to review the information prior to proceeding.  The patient has agreed to proceed and will follow up with me for the procedure.  As she is taking Coumadin, we have placed a call to her cardiologist Dr. Miranda [phone] to discuss holding the medication for 5 days prior to the procedure.

## 2020-12-01 NOTE — HISTORY OF PRESENT ILLNESS
[Back] : back [___ yrs] : [unfilled] year(s) ago [9] : a maximum pain level of 9/10 [Sharp] : sharp [Bilateral] : bilateral [Posterior] : posterior aspect of the [Calf] : calf [Sitting] : sitting [Walking] : walking [Laying] : laying [Insomnia] : insomnia [Gait Dysfunction] : gait dysfunction [PT] : PT [Acupuncture] : acupuncture [Medications] : medications [FreeTextEntry6] : Tylenol

## 2020-12-01 NOTE — DATA REVIEWED
[de-identified] : EXAM: CT LUMBAR SPINE\par \par \par PROCEDURE DATE: 10/22/2020\par \par \par \par INTERPRETATION: EXAMINATION: CT lumbar spine without contrast\par \par CLINICAL INFORMATION: Spinal stenosis. Bilateral L5-S1 radiculopathy.\par \par TECHNIQUE: Axial CT images were obtained through the lumbar spine. Coronal and sagittal reformatted images were made.\par \par FINDINGS: Vertebral body heights are maintained. There is an S-shaped scoliosis. There is minimal retro-listhesis at L3-L4. There is severe disc height loss at T12-L1, L1-L2 and L2-L3. There is moderate disc height loss at L3-L4.\par \par T12-L1: Small disc bulge with osseous ridging which is greater on the right. Moderate right foraminal narrowing. No spinal canal stenosis.\par \par L1-L2: Disc bulge with osseous ridging which is greater on the right. Mild spinal canal stenosis and moderate right foraminal narrowing.\par \par L2-L3: Disc bulge with osseous ridging which is greater on the right. Moderate bilateral foraminal narrowing, right greater than left. Mild spinal canal stenosis. Mild bilateral facet arthrosis.\par \par L3-L4: Small disc bulge with osseous ridging. Moderate bilateral foraminal narrowing. Moderate spinal canal stenosis.\par \par L4-L5: Disc bulge with osseous ridging which is greater on the left. Mild bilateral facet arthrosis. At least moderate to severe spinal canal stenosis is suspected with moderate to severe right and moderate left foraminal narrowing.\par \par L5-S1: Small disc bulge. Advanced left and mild right facet arthrosis. Mild bilateral foraminal narrowing.\par \par The imaged portions of the sacroiliac joints are unremarkable.\par \par IMPRESSION: Scoliosis with multilevel spondylosis, as above. This contributes to multilevel spinal canal stenosis that is most severe at the L4-5 level where there is suspected to be moderate to severe spinal canal stenosis. Multilevel foraminal narrowing.\par \par \par \par \par \par \par AMAYA PERSON MD; Attending Radiologist\par This document has been electronically signed. Oct 23 2020 1:12PM\par \par

## 2020-12-11 ENCOUNTER — APPOINTMENT (OUTPATIENT)
Dept: DISASTER EMERGENCY | Facility: CLINIC | Age: 55
End: 2020-12-11

## 2020-12-11 DIAGNOSIS — Z01.818 ENCOUNTER FOR OTHER PREPROCEDURAL EXAMINATION: ICD-10-CM

## 2020-12-13 ENCOUNTER — TRANSCRIPTION ENCOUNTER (OUTPATIENT)
Age: 55
End: 2020-12-13

## 2020-12-13 LAB — SARS-COV-2 N GENE NPH QL NAA+PROBE: NOT DETECTED

## 2020-12-14 ENCOUNTER — APPOINTMENT (OUTPATIENT)
Dept: NEUROSURGERY | Facility: CLINIC | Age: 55
End: 2020-12-14
Payer: COMMERCIAL

## 2020-12-14 ENCOUNTER — OUTPATIENT (OUTPATIENT)
Dept: OUTPATIENT SERVICES | Facility: HOSPITAL | Age: 55
LOS: 1 days | End: 2020-12-14
Payer: COMMERCIAL

## 2020-12-14 DIAGNOSIS — Z98.89 OTHER SPECIFIED POSTPROCEDURAL STATES: Chronic | ICD-10-CM

## 2020-12-14 DIAGNOSIS — Z98.891 HISTORY OF UTERINE SCAR FROM PREVIOUS SURGERY: Chronic | ICD-10-CM

## 2020-12-14 DIAGNOSIS — M54.16 RADICULOPATHY, LUMBAR REGION: ICD-10-CM

## 2020-12-14 PROCEDURE — 62323 NJX INTERLAMINAR LMBR/SAC: CPT

## 2021-03-03 ENCOUNTER — APPOINTMENT (OUTPATIENT)
Dept: NEUROSURGERY | Facility: CLINIC | Age: 56
End: 2021-03-03
Payer: COMMERCIAL

## 2021-03-03 VITALS
DIASTOLIC BLOOD PRESSURE: 87 MMHG | WEIGHT: 171 LBS | HEART RATE: 70 BPM | BODY MASS INDEX: 31.47 KG/M2 | SYSTOLIC BLOOD PRESSURE: 128 MMHG | HEIGHT: 62 IN

## 2021-03-03 PROCEDURE — 99072 ADDL SUPL MATRL&STAF TM PHE: CPT

## 2021-03-03 PROCEDURE — 99214 OFFICE O/P EST MOD 30 MIN: CPT

## 2021-03-03 NOTE — REASON FOR VISIT
[Follow-Up: _____] : a [unfilled] follow-up visit [FreeTextEntry1] : Patient returns following her first LESI.  She states she feels 50% improved as compared to prior to her procedure.  She is here to discuss her progress.

## 2021-03-03 NOTE — ASSESSMENT
[FreeTextEntry1] : 56 year old female with low back pain and lumbar radicular pain now moderately improved following her first LESI.  She is agreeable to a repeat injection and will follow up with me next week for her procedure.

## 2021-03-05 ENCOUNTER — APPOINTMENT (OUTPATIENT)
Dept: DISASTER EMERGENCY | Facility: CLINIC | Age: 56
End: 2021-03-05

## 2021-03-07 ENCOUNTER — TRANSCRIPTION ENCOUNTER (OUTPATIENT)
Age: 56
End: 2021-03-07

## 2021-03-08 ENCOUNTER — OUTPATIENT (OUTPATIENT)
Dept: OUTPATIENT SERVICES | Facility: HOSPITAL | Age: 56
LOS: 1 days | End: 2021-03-08
Payer: COMMERCIAL

## 2021-03-08 ENCOUNTER — APPOINTMENT (OUTPATIENT)
Dept: NEUROSURGERY | Facility: CLINIC | Age: 56
End: 2021-03-08
Payer: COMMERCIAL

## 2021-03-08 DIAGNOSIS — Z98.891 HISTORY OF UTERINE SCAR FROM PREVIOUS SURGERY: Chronic | ICD-10-CM

## 2021-03-08 DIAGNOSIS — M54.16 RADICULOPATHY, LUMBAR REGION: ICD-10-CM

## 2021-03-08 DIAGNOSIS — Z98.89 OTHER SPECIFIED POSTPROCEDURAL STATES: Chronic | ICD-10-CM

## 2021-03-08 LAB — SARS-COV-2 N GENE NPH QL NAA+PROBE: NOT DETECTED

## 2021-03-08 PROCEDURE — 62323 NJX INTERLAMINAR LMBR/SAC: CPT

## 2021-04-07 ENCOUNTER — APPOINTMENT (OUTPATIENT)
Dept: NEUROSURGERY | Facility: CLINIC | Age: 56
End: 2021-04-07
Payer: COMMERCIAL

## 2021-04-07 DIAGNOSIS — M51.36 OTHER INTERVERTEBRAL DISC DEGENERATION, LUMBAR REGION: ICD-10-CM

## 2021-04-07 DIAGNOSIS — M54.16 RADICULOPATHY, LUMBAR REGION: ICD-10-CM

## 2021-04-07 DIAGNOSIS — M47.816 SPONDYLOSIS W/OUT MYELOPATHY OR RADICULOPATHY, LUMBAR REGION: ICD-10-CM

## 2021-04-07 DIAGNOSIS — M48.061 SPINAL STENOSIS, LUMBAR REGION WITHOUT NEUROGENIC CLAUDICATION: ICD-10-CM

## 2021-04-07 PROCEDURE — 99203 OFFICE O/P NEW LOW 30 MIN: CPT | Mod: 95

## 2021-04-07 PROCEDURE — 99213 OFFICE O/P EST LOW 20 MIN: CPT | Mod: 95

## 2021-04-07 NOTE — REASON FOR VISIT
[Home] : at home, [unfilled] , at the time of the visit. [Medical Office: ([facility])___] : at the medical office located in  [Family Member] : family member [Verbal consent obtained from patient] : the patient, [unfilled] [Follow-Up: _____] : a [unfilled] follow-up visit [FreeTextEntry1] : Patient returns after her second LESI.  She states that she feels 50% improved as compare to prior to her procedure.  She also complains of knee pain and swelling.

## 2021-04-07 NOTE — ASSESSMENT
[FreeTextEntry1] : 56 year old female with low back and lumbar radicular pain now moderately improved following her second injection.  She will follow up with Dr. Cochran for her knee pain and follow up with me thereafter.

## 2021-04-09 ENCOUNTER — APPOINTMENT (OUTPATIENT)
Dept: NEUROSURGERY | Facility: CLINIC | Age: 56
End: 2021-04-09

## 2021-04-14 ENCOUNTER — APPOINTMENT (OUTPATIENT)
Dept: ORTHOPEDIC SURGERY | Facility: CLINIC | Age: 56
End: 2021-04-14
Payer: COMMERCIAL

## 2021-04-14 VITALS
BODY MASS INDEX: 31.47 KG/M2 | SYSTOLIC BLOOD PRESSURE: 148 MMHG | HEART RATE: 72 BPM | WEIGHT: 171 LBS | DIASTOLIC BLOOD PRESSURE: 88 MMHG | HEIGHT: 62 IN

## 2021-04-14 DIAGNOSIS — M76.02 GLUTEAL TENDINITIS, RIGHT HIP: ICD-10-CM

## 2021-04-14 DIAGNOSIS — M76.01 GLUTEAL TENDINITIS, RIGHT HIP: ICD-10-CM

## 2021-04-14 PROCEDURE — 20611 DRAIN/INJ JOINT/BURSA W/US: CPT | Mod: LT

## 2021-04-14 PROCEDURE — 99214 OFFICE O/P EST MOD 30 MIN: CPT | Mod: 25

## 2021-04-14 PROCEDURE — 99204 OFFICE O/P NEW MOD 45 MIN: CPT | Mod: 25

## 2021-04-14 PROCEDURE — 99072 ADDL SUPL MATRL&STAF TM PHE: CPT

## 2022-04-17 NOTE — DISCHARGE NOTE PROVIDER - NSDCCPGOAL_GEN_ALL_CORE_FT
01-Jun-2021 Follow up in 7-10 days. Please call the office to make an appointment. Activity as tolerated. Rest as needed. You may eat a low residue diet. Call for any fever over 101, nausea, vomiting, severe pain, no passing of gas or bowel movement.

## 2022-07-08 NOTE — ED PROVIDER NOTE - EKG ADDITIONAL INFORMATION FREE TEXT
"Reason For Visit:   Chief Complaint   Patient presents with     Consult     Left hip pain and arthritis // discuss treatment options // had left hip surgery in 2003        Ht 1.74 m (5' 8.5\")   Wt 93.4 kg (206 lb)   BMI 30.86 kg/m      Pain Assessment  Patient Currently in Pain: Yes  0-10 Pain Scale: 7 (with walking)  Primary Pain Location: Hip (left hip)    Remi Watson ATC    "
NSR, rate 77, no acute st e/d, no twi, qtc 432